# Patient Record
Sex: MALE | Race: WHITE | NOT HISPANIC OR LATINO | Employment: FULL TIME | URBAN - METROPOLITAN AREA
[De-identification: names, ages, dates, MRNs, and addresses within clinical notes are randomized per-mention and may not be internally consistent; named-entity substitution may affect disease eponyms.]

---

## 2018-07-20 ENCOUNTER — OFFICE VISIT (OUTPATIENT)
Dept: FAMILY MEDICINE | Facility: CLINIC | Age: 48
End: 2018-07-20
Payer: COMMERCIAL

## 2018-07-20 VITALS
TEMPERATURE: 99 F | HEART RATE: 84 BPM | HEIGHT: 77 IN | SYSTOLIC BLOOD PRESSURE: 122 MMHG | BODY MASS INDEX: 30.13 KG/M2 | OXYGEN SATURATION: 99 % | DIASTOLIC BLOOD PRESSURE: 82 MMHG | WEIGHT: 255.19 LBS

## 2018-07-20 DIAGNOSIS — Z23 NEED FOR TDAP VACCINATION: ICD-10-CM

## 2018-07-20 DIAGNOSIS — Z00.00 PREVENTATIVE HEALTH CARE: Primary | ICD-10-CM
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
## 2018-07-20 PROCEDURE — 90715 TDAP VACCINE 7 YRS/> IM: CPT | Mod: S$GLB,,, | Performed by: FAMILY MEDICINE

## 2018-07-20 PROCEDURE — 90471 IMMUNIZATION ADMIN: CPT | Mod: S$GLB,,, | Performed by: FAMILY MEDICINE

## 2018-07-20 PROCEDURE — 99999 PR PBB SHADOW E&M-EST. PATIENT-LVL III: CPT | Mod: PBBFAC,,, | Performed by: FAMILY MEDICINE

## 2018-07-20 PROCEDURE — 99386 PREV VISIT NEW AGE 40-64: CPT | Mod: 25,S$GLB,, | Performed by: FAMILY MEDICINE

## 2018-07-20 NOTE — PROGRESS NOTES
xc  THIS DOCUMENT WAS MADE IN PART WITH VOICE RECOGNITION SOFTWARE.  OCCASIONALLY THIS SOFTWARE WILL MISINTERPRET WORDS OR PHRASES.      Cornell Rodas Heidi  1970    Subjective          Chief Complaint   Patient presents with    Establish Care       HPI:     establish care. Here for a wellness exam and a few additional topics.     Recently moved back here from [place] I believe. 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     He has no chronic health problems.     He does report family history of prostate cancer father and maternal grandfather. He has had no urinary symptoms changes or previous concerns.     He does report that he remove the tick from his right inner thigh a few weeks ago. He doesn't think it was on for very long probably not more than an hour as you notice it after he had been doing some work outside. It appears he did remove it intact. There is no rash now and no rash initially. There is no longer even a bite oma visible. He said no fever or chills. Joint pains, no swollen glands. He states the ticket did have a white spot on his back which suggest this was a [place] tick.      Active Ambulatory Problems     Diagnosis Date Noted    Stress fracture of femur with routine healing 09/16/2014     Resolved Ambulatory Problems     Diagnosis Date Noted    No Resolved Ambulatory Problems     No Additional Past Medical History       No current outpatient prescriptions on file prior to visit.     No current facility-administered medications on file prior to visit.        Review of patient's allergies indicates:   Allergen Reactions    Motrin [ibuprofen] Hives       Family History   Problem Relation Age of Onset    Breast cancer Mother     Prostate cancer Father     Prostate cancer Maternal Grandfather        Social History   Substance Use Topics    Smoking status: Never Smoker    Smokeless tobacco: Never Used    Alcohol use Yes      Comment: pt states occasionally one one drink per  week          Review of Systems   Constitutional: Negative for activity change and unexpected weight change.   HENT: Negative for hearing loss, rhinorrhea and trouble swallowing.    Eyes: Negative for discharge and visual disturbance.   Respiratory: Negative for chest tightness and wheezing.    Cardiovascular: Negative for chest pain and palpitations.   Gastrointestinal: Negative for blood in stool, constipation, diarrhea and vomiting.   Endocrine: Negative for polydipsia and polyuria.   Genitourinary: Negative for difficulty urinating, hematuria and urgency.   Musculoskeletal: Negative for arthralgias, joint swelling and neck pain.   Neurological: Negative for weakness and headaches.   Psychiatric/Behavioral: Negative for confusion and dysphoric mood.       Objective     Physical Exam   Constitutional: He is oriented to person, place, and time. He appears well-developed and well-nourished. No distress.   HENT:   Head: Normocephalic and atraumatic.   Right Ear: Tympanic membrane, external ear and ear canal normal.   Left Ear: Tympanic membrane, external ear and ear canal normal.   Nose: Nose normal.   Mouth/Throat: Oropharynx is clear and moist.   Eyes: Conjunctivae and EOM are normal. Pupils are equal, round, and reactive to light. Right eye exhibits no discharge. Left eye exhibits no discharge. No scleral icterus.   Neck: Normal range of motion. Neck supple. No JVD present. No tracheal deviation present. No thyromegaly present.   Cardiovascular: Normal rate, regular rhythm, normal heart sounds and intact distal pulses.  Exam reveals no gallop and no friction rub.    No murmur heard.  Pulmonary/Chest: Effort normal and breath sounds normal. No stridor. No respiratory distress. He has no wheezes. He has no rales. He exhibits no tenderness.   Abdominal: Soft. Bowel sounds are normal. He exhibits no distension and no mass. There is no tenderness. There is no rebound and no guarding.   Lymphadenopathy:     He has no  "cervical adenopathy.   Neurological: He is alert and oriented to person, place, and time. He has normal reflexes. He displays normal reflexes. No cranial nerve deficit. He exhibits normal muscle tone. Coordination normal.   Skin: Skin is warm. No rash noted. He is not diaphoretic.   Examination of the right thigh where the tick bite apparently occurred reveals no rash, no lesion or remaining evidence of bite or infection.    Also he has multiple light brown nevi on his back. There is at least one that has slight regular distribution of pigment.   Psychiatric: He has a normal mood and affect. His behavior is normal. Judgment and thought content normal.   Vitals reviewed.      One irregular lesion on back, recommend derm    Vitals:    07/20/18 1536   BP: 122/82   BP Location: Left arm   Patient Position: Sitting   BP Method: Medium (Manual)   Pulse: 84   Temp: 98.8 °F (37.1 °C)   TempSrc: Oral   SpO2: 99%   Weight: 115.8 kg (255 lb 2.9 oz)   Height: 6' 5" (1.956 m)         Cornell was seen today for establish care.    Diagnoses and all orders for this visit:    Preventative health care  -     Comprehensive metabolic panel; Future  -     TSH; Future  -     Lipid panel; Future  -     CBC auto differential; Future  -     (In Office Administered) Tdap Vaccine  -     LYME DISEASE ANTIBODY BY EIA; Future    Need for Tdap vaccination  -     (In Office Administered) Tdap Vaccine    Tick bite, initial encounter  -     LYME DISEASE ANTIBODY BY EIA; Future     I did review possible Lyme transmission in this area and from the [place] tick. This tick does not appear to carry Lyme disease but of course I can't specifically identify the tick without having seen it. As a precaution will check lyme titers. But if the tick was not attached for very long risk is likely very low. There are other tickborne illnesses and concerns that have been raised with the [place] tic so he will watch for any development of symptoms or concerns as " well. Will check CBC along with additional labs.    Age specific and appropriate preventative healthcare discussed/ health maintenance reviewed. Including discussed current recommendations and controversy regarding prostate cancer screening. He would like to hold off on screening until he turns 50.

## 2018-07-23 ENCOUNTER — LAB VISIT (OUTPATIENT)
Dept: LAB | Facility: HOSPITAL | Age: 48
End: 2018-07-23
Attending: FAMILY MEDICINE
Payer: COMMERCIAL

## 2018-07-23 DIAGNOSIS — Z00.00 PREVENTATIVE HEALTH CARE: ICD-10-CM

## 2018-07-23 LAB
ALBUMIN SERPL BCP-MCNC: 4.3 G/DL
ALP SERPL-CCNC: 104 U/L
ALT SERPL W/O P-5'-P-CCNC: 26 U/L
ANION GAP SERPL CALC-SCNC: 10 MMOL/L
AST SERPL-CCNC: 28 U/L
BILIRUB SERPL-MCNC: 0.7 MG/DL
BUN SERPL-MCNC: 14 MG/DL
CALCIUM SERPL-MCNC: 9.9 MG/DL
CHLORIDE SERPL-SCNC: 100 MMOL/L
CHOLEST SERPL-MCNC: 191 MG/DL
CHOLEST/HDLC SERPL: 4.2 {RATIO}
CO2 SERPL-SCNC: 26 MMOL/L
CREAT SERPL-MCNC: 1.2 MG/DL
EST. GFR  (AFRICAN AMERICAN): >60 ML/MIN/1.73 M^2
EST. GFR  (NON AFRICAN AMERICAN): >60 ML/MIN/1.73 M^2
GLUCOSE SERPL-MCNC: 91 MG/DL
HDLC SERPL-MCNC: 46 MG/DL
HDLC SERPL: 24.1 %
LDLC SERPL CALC-MCNC: 119.8 MG/DL
NONHDLC SERPL-MCNC: 145 MG/DL
POTASSIUM SERPL-SCNC: 5.3 MMOL/L
PROT SERPL-MCNC: 8.1 G/DL
SODIUM SERPL-SCNC: 136 MMOL/L
TRIGL SERPL-MCNC: 126 MG/DL
TSH SERPL DL<=0.005 MIU/L-ACNC: 2.07 UIU/ML

## 2018-07-23 PROCEDURE — 84443 ASSAY THYROID STIM HORMONE: CPT

## 2018-07-23 PROCEDURE — 80053 COMPREHEN METABOLIC PANEL: CPT

## 2018-07-23 PROCEDURE — 36415 COLL VENOUS BLD VENIPUNCTURE: CPT | Mod: PN

## 2018-07-23 PROCEDURE — 85025 COMPLETE CBC W/AUTO DIFF WBC: CPT

## 2018-07-23 PROCEDURE — 80061 LIPID PANEL: CPT

## 2018-07-24 LAB
BASOPHILS # BLD AUTO: 0.05 K/UL
BASOPHILS NFR BLD: 0.7 %
DIFFERENTIAL METHOD: ABNORMAL
EOSINOPHIL # BLD AUTO: 0.2 K/UL
EOSINOPHIL NFR BLD: 2.9 %
ERYTHROCYTE [DISTWIDTH] IN BLOOD BY AUTOMATED COUNT: 13.1 %
HCT VFR BLD AUTO: 48.2 %
HGB BLD-MCNC: 15.5 G/DL
IMM GRANULOCYTES # BLD AUTO: 0.02 K/UL
IMM GRANULOCYTES NFR BLD AUTO: 0.3 %
LYMPHOCYTES # BLD AUTO: 2.9 K/UL
LYMPHOCYTES NFR BLD: 40.6 %
MCH RBC QN AUTO: 26.9 PG
MCHC RBC AUTO-ENTMCNC: 32.2 G/DL
MCV RBC AUTO: 84 FL
MONOCYTES # BLD AUTO: 0.8 K/UL
MONOCYTES NFR BLD: 11.3 %
NEUTROPHILS # BLD AUTO: 3.2 K/UL
NEUTROPHILS NFR BLD: 44.2 %
NRBC BLD-RTO: 0 /100 WBC
PLATELET # BLD AUTO: 240 K/UL
PMV BLD AUTO: 12 FL
RBC # BLD AUTO: 5.76 M/UL
WBC # BLD AUTO: 7.16 K/UL

## 2019-07-19 ENCOUNTER — PATIENT OUTREACH (OUTPATIENT)
Dept: ADMINISTRATIVE | Facility: HOSPITAL | Age: 49
End: 2019-07-19

## 2019-08-23 ENCOUNTER — LAB VISIT (OUTPATIENT)
Dept: LAB | Facility: HOSPITAL | Age: 49
End: 2019-08-23
Attending: FAMILY MEDICINE
Payer: COMMERCIAL

## 2019-08-23 ENCOUNTER — OFFICE VISIT (OUTPATIENT)
Dept: FAMILY MEDICINE | Facility: CLINIC | Age: 49
End: 2019-08-23
Payer: COMMERCIAL

## 2019-08-23 VITALS
TEMPERATURE: 98 F | HEIGHT: 77 IN | BODY MASS INDEX: 27.58 KG/M2 | HEART RATE: 60 BPM | RESPIRATION RATE: 16 BRPM | SYSTOLIC BLOOD PRESSURE: 110 MMHG | DIASTOLIC BLOOD PRESSURE: 70 MMHG | WEIGHT: 233.56 LBS

## 2019-08-23 DIAGNOSIS — Z00.00 PREVENTATIVE HEALTH CARE: Primary | ICD-10-CM

## 2019-08-23 DIAGNOSIS — Z00.00 PREVENTATIVE HEALTH CARE: ICD-10-CM

## 2019-08-23 LAB
ALBUMIN SERPL BCP-MCNC: 4.7 G/DL (ref 3.5–5.2)
ALP SERPL-CCNC: 99 U/L (ref 55–135)
ALT SERPL W/O P-5'-P-CCNC: 14 U/L (ref 10–44)
ANION GAP SERPL CALC-SCNC: 9 MMOL/L (ref 8–16)
AST SERPL-CCNC: 18 U/L (ref 10–40)
BASOPHILS # BLD AUTO: 0.03 K/UL (ref 0–0.2)
BASOPHILS NFR BLD: 0.4 % (ref 0–1.9)
BILIRUB SERPL-MCNC: 0.6 MG/DL (ref 0.1–1)
BUN SERPL-MCNC: 14 MG/DL (ref 6–20)
CALCIUM SERPL-MCNC: 9.7 MG/DL (ref 8.7–10.5)
CHLORIDE SERPL-SCNC: 104 MMOL/L (ref 95–110)
CHOLEST SERPL-MCNC: 207 MG/DL (ref 120–199)
CHOLEST/HDLC SERPL: 3.8 {RATIO} (ref 2–5)
CO2 SERPL-SCNC: 27 MMOL/L (ref 23–29)
CREAT SERPL-MCNC: 1.1 MG/DL (ref 0.5–1.4)
DIFFERENTIAL METHOD: ABNORMAL
EOSINOPHIL # BLD AUTO: 0.1 K/UL (ref 0–0.5)
EOSINOPHIL NFR BLD: 1.5 % (ref 0–8)
ERYTHROCYTE [DISTWIDTH] IN BLOOD BY AUTOMATED COUNT: 13.5 % (ref 11.5–14.5)
EST. GFR  (AFRICAN AMERICAN): >60 ML/MIN/1.73 M^2
EST. GFR  (NON AFRICAN AMERICAN): >60 ML/MIN/1.73 M^2
GLUCOSE SERPL-MCNC: 88 MG/DL (ref 70–110)
HCT VFR BLD AUTO: 48.9 % (ref 40–54)
HDLC SERPL-MCNC: 54 MG/DL (ref 40–75)
HDLC SERPL: 26.1 % (ref 20–50)
HGB BLD-MCNC: 15.1 G/DL (ref 14–18)
IMM GRANULOCYTES # BLD AUTO: 0.01 K/UL (ref 0–0.04)
IMM GRANULOCYTES NFR BLD AUTO: 0.1 % (ref 0–0.5)
LDLC SERPL CALC-MCNC: 121.4 MG/DL (ref 63–159)
LYMPHOCYTES # BLD AUTO: 2.5 K/UL (ref 1–4.8)
LYMPHOCYTES NFR BLD: 36.2 % (ref 18–48)
MCH RBC QN AUTO: 26.5 PG (ref 27–31)
MCHC RBC AUTO-ENTMCNC: 30.9 G/DL (ref 32–36)
MCV RBC AUTO: 86 FL (ref 82–98)
MONOCYTES # BLD AUTO: 0.5 K/UL (ref 0.3–1)
MONOCYTES NFR BLD: 6.8 % (ref 4–15)
NEUTROPHILS # BLD AUTO: 3.8 K/UL (ref 1.8–7.7)
NEUTROPHILS NFR BLD: 55 % (ref 38–73)
NONHDLC SERPL-MCNC: 153 MG/DL
NRBC BLD-RTO: 0 /100 WBC
PLATELET # BLD AUTO: 240 K/UL (ref 150–350)
PMV BLD AUTO: 12.6 FL (ref 9.2–12.9)
POTASSIUM SERPL-SCNC: 4.5 MMOL/L (ref 3.5–5.1)
PROT SERPL-MCNC: 8.1 G/DL (ref 6–8.4)
RBC # BLD AUTO: 5.69 M/UL (ref 4.6–6.2)
SODIUM SERPL-SCNC: 140 MMOL/L (ref 136–145)
TRIGL SERPL-MCNC: 158 MG/DL (ref 30–150)
WBC # BLD AUTO: 6.88 K/UL (ref 3.9–12.7)

## 2019-08-23 PROCEDURE — 99999 PR PBB SHADOW E&M-EST. PATIENT-LVL III: ICD-10-PCS | Mod: PBBFAC,,, | Performed by: FAMILY MEDICINE

## 2019-08-23 PROCEDURE — 99999 PR PBB SHADOW E&M-EST. PATIENT-LVL III: CPT | Mod: PBBFAC,,, | Performed by: FAMILY MEDICINE

## 2019-08-23 PROCEDURE — 99396 PREV VISIT EST AGE 40-64: CPT | Mod: S$GLB,,, | Performed by: FAMILY MEDICINE

## 2019-08-23 PROCEDURE — 85025 COMPLETE CBC W/AUTO DIFF WBC: CPT

## 2019-08-23 PROCEDURE — 80053 COMPREHEN METABOLIC PANEL: CPT

## 2019-08-23 PROCEDURE — 99396 PR PREVENTIVE VISIT,EST,40-64: ICD-10-PCS | Mod: S$GLB,,, | Performed by: FAMILY MEDICINE

## 2019-08-23 PROCEDURE — 36415 COLL VENOUS BLD VENIPUNCTURE: CPT | Mod: PN

## 2019-08-23 PROCEDURE — 80061 LIPID PANEL: CPT

## 2019-08-23 NOTE — PROGRESS NOTES
THIS DOCUMENT WAS MADE IN PART WITH VOICE RECOGNITION SOFTWARE.  OCCASIONALLY THIS SOFTWARE WILL MISINTERPRET WORDS OR PHRASES.      Cornell Gordon  1970    Cornell was seen today for annual exam.    Diagnoses and all orders for this visit:    Preventative health care  -     Comprehensive metabolic panel; Future  -     Lipid panel; Future  -     CBC auto differential; Future   He is doing well with more than 20 pound weight loss over the last year. Reviewed BMI charts and standards.  Will continue healthy lifestyle and returning one year for physical. If anything changes or if he develops any concerns he will certainly let me know      Subjective     Chief Complaint   Patient presents with    Annual Exam       HPI     He returns for his annual wellness. He has been doing well. States has lost significant weight gradually over the last year with a healthy diet and regular exercise. He feels well.    Active Ambulatory Problems     Diagnosis Date Noted    Stress fracture of femur with routine healing 09/16/2014     Resolved Ambulatory Problems     Diagnosis Date Noted    No Resolved Ambulatory Problems     No Additional Past Medical History         Review of Systems   Constitutional: Negative.    HENT: Negative.    Eyes: Negative.    Respiratory: Negative.    Cardiovascular: Negative.    Gastrointestinal: Negative.    Endocrine: Negative.    Genitourinary: Negative.    Musculoskeletal: Negative.        Objective     Physical Exam   Constitutional: He is oriented to person, place, and time. He appears well-developed and well-nourished. No distress.   HENT:   Head: Normocephalic and atraumatic.   Right Ear: Tympanic membrane, external ear and ear canal normal.   Left Ear: Tympanic membrane, external ear and ear canal normal.   Nose: Nose normal.   Mouth/Throat: Oropharynx is clear and moist. No oropharyngeal exudate.   Eyes: Pupils are equal, round, and reactive to light. Conjunctivae and EOM are normal. Right  "eye exhibits no discharge. Left eye exhibits no discharge. No scleral icterus.   Neck: Normal range of motion. Neck supple. No thyromegaly present.   Cardiovascular: Normal rate, regular rhythm, normal heart sounds and intact distal pulses. Exam reveals no gallop and no friction rub.   No murmur heard.  Pulmonary/Chest: Effort normal and breath sounds normal. No stridor. No respiratory distress. He has no wheezes. He has no rales. He exhibits no tenderness.   Abdominal: Soft. Bowel sounds are normal. He exhibits no distension and no mass. There is no tenderness. There is no rebound and no guarding.   Lymphadenopathy:     He has no cervical adenopathy.   Neurological: He is alert and oriented to person, place, and time. He has normal reflexes. He exhibits normal muscle tone. Coordination normal.   Skin: He is not diaphoretic.   Psychiatric: He has a normal mood and affect. His behavior is normal.   Vitals reviewed.    Vitals:    08/23/19 1107   BP: 110/70   BP Location: Left arm   Patient Position: Sitting   BP Method: Large (Manual)   Pulse: 60   Resp: 16   Temp: 97.7 °F (36.5 °C)   TempSrc: Oral   Weight: 106 kg (233 lb 9.2 oz)   Height: 6' 5" (1.956 m)           "

## 2019-11-27 ENCOUNTER — OFFICE VISIT (OUTPATIENT)
Dept: DERMATOLOGY | Facility: CLINIC | Age: 49
End: 2019-11-27
Payer: COMMERCIAL

## 2019-11-27 VITALS — HEIGHT: 77 IN | BODY MASS INDEX: 27.51 KG/M2 | WEIGHT: 233 LBS

## 2019-11-27 DIAGNOSIS — Z12.83 SKIN CANCER SCREENING: ICD-10-CM

## 2019-11-27 DIAGNOSIS — D48.5 NEOPLASM OF UNCERTAIN BEHAVIOR OF SKIN: Primary | ICD-10-CM

## 2019-11-27 DIAGNOSIS — L82.1 SEBORRHEIC KERATOSES: ICD-10-CM

## 2019-11-27 PROCEDURE — 3008F BODY MASS INDEX DOCD: CPT | Mod: CPTII,S$GLB,, | Performed by: DERMATOLOGY

## 2019-11-27 PROCEDURE — 99999 PR PBB SHADOW E&M-EST. PATIENT-LVL III: ICD-10-PCS | Mod: PBBFAC,,, | Performed by: DERMATOLOGY

## 2019-11-27 PROCEDURE — 99202 OFFICE O/P NEW SF 15 MIN: CPT | Mod: 25,S$GLB,, | Performed by: DERMATOLOGY

## 2019-11-27 PROCEDURE — 88305 TISSUE EXAM BY PATHOLOGIST: CPT | Mod: 26,,, | Performed by: PATHOLOGY

## 2019-11-27 PROCEDURE — 11102 PR TANGENTIAL BIOPSY, SKIN, SINGLE LESION: ICD-10-PCS | Mod: S$GLB,,, | Performed by: DERMATOLOGY

## 2019-11-27 PROCEDURE — 88305 TISSUE EXAM BY PATHOLOGIST: ICD-10-PCS | Mod: 26,,, | Performed by: PATHOLOGY

## 2019-11-27 PROCEDURE — 99202 PR OFFICE/OUTPT VISIT, NEW, LEVL II, 15-29 MIN: ICD-10-PCS | Mod: 25,S$GLB,, | Performed by: DERMATOLOGY

## 2019-11-27 PROCEDURE — 99999 PR PBB SHADOW E&M-EST. PATIENT-LVL III: CPT | Mod: PBBFAC,,, | Performed by: DERMATOLOGY

## 2019-11-27 PROCEDURE — 3008F PR BODY MASS INDEX (BMI) DOCUMENTED: ICD-10-PCS | Mod: CPTII,S$GLB,, | Performed by: DERMATOLOGY

## 2019-11-27 PROCEDURE — 11102 TANGNTL BX SKIN SINGLE LES: CPT | Mod: S$GLB,,, | Performed by: DERMATOLOGY

## 2019-11-27 PROCEDURE — 88305 TISSUE EXAM BY PATHOLOGIST: CPT | Performed by: PATHOLOGY

## 2019-11-27 NOTE — PROGRESS NOTES
Subjective:       Patient ID:  Cornell Gordon is a 48 y.o. male who presents for   Chief Complaint   Patient presents with    Skin Check     TBSE    Spot     Mid back, years, darker, no tx     Initial visit    Here today for TBSE and spot to mid back for few years.  Darker in color, no tx.    Hx of moderate sun exposure, few blistering sunburns in youth.  Yardwork, runner, and beach activities    Previously seen by derm 5 years ago in [place], pt uncertain to name  Bx on forearm-per pt, benign    Pt denies Phx NMSC  Pt denies Fhx MM      Review of Systems   Constitutional: Negative for fever, chills and fatigue.   Skin: Positive for activity-related sunscreen use and wears hat. Negative for daily sunscreen use.   Hematologic/Lymphatic: Does not bruise/bleed easily.        Objective:    Physical Exam   Constitutional: He appears well-developed and well-nourished. No distress.   Neurological: He is alert and oriented to person, place, and time. He is not disoriented.   Psychiatric: He has a normal mood and affect.   Skin:   Areas Examined (abnormalities noted in diagram):   Head / Face Inspection Performed  Neck Inspection Performed  Chest / Axilla Inspection Performed  Abdomen Inspection Performed  Back Inspection Performed  RUE Inspected  LUE Inspection Performed  RLE Inspected  LLE Inspection Performed                   Diagram Legend     Erythematous scaling macule/papule c/w actinic keratosis       Vascular papule c/w angioma      Pigmented verrucoid papule/plaque c/w seborrheic keratosis      Yellow umbilicated papule c/w sebaceous hyperplasia      Irregularly shaped tan macule c/w lentigo     1-2 mm smooth white papules consistent with Milia      Movable subcutaneous cyst with punctum c/w epidermal inclusion cyst      Subcutaneous movable cyst c/w pilar cyst      Firm pink to brown papule c/w dermatofibroma      Pedunculated fleshy papule(s) c/w skin tag(s)      Evenly pigmented macule c/w junctional nevus      Mildly variegated pigmented, slightly irregular-bordered macule c/w mildly atypical nevus      Flesh colored to evenly pigmented papule c/w intradermal nevus       Pink pearly papule/plaque c/w basal cell carcinoma      Erythematous hyperkeratotic cursted plaque c/w SCC      Surgical scar with no sign of skin cancer recurrence      Open and closed comedones      Inflammatory papules and pustules      Verrucoid papule consistent consistent with wart     Erythematous eczematous patches and plaques     Dystrophic onycholytic nail with subungual debris c/w onychomycosis     Umbilicated papule    Erythematous-base heme-crusted tan verrucoid plaque consistent with inflamed seborrheic keratosis     Erythematous Silvery Scaling Plaque c/w Psoriasis     See annotation      Assessment / Plan:      Pathology Orders:     Normal Orders This Visit    Specimen to Pathology, Dermatology     Questions:    Procedure Type:  Dermatology and skin neoplasms    Number of Specimens:  1    ------------------------:  -------------------------    Spec 1 Procedure:  Biopsy    Spec 1 Clinical Impression:  isk r/o inflamed nevus vs other    Spec 1 Source:  left back        Neoplasm of uncertain behavior of skin  -     Specimen to Pathology, Dermatology  Shave biopsy procedure note:    Shave biopsy performed after verbal consent including risk of infection, scar, recurrence, need for additional treatment of site. Area prepped with alcohol, anesthetized with approximately 1.0cc of 1% lidocaine with epinephrine. Lesional tissue shaved with razor blade. Hemostasis achieved with application of aluminum chloride followed by hyfrecation. No complications. Dressing applied. Wound care explained.        Seborrheic keratoses  Back  These are benign inherited growths without a malignant potential. Reassurance given to patient. No treatment is necessary.     Skin cancer screening  Upper body skin examination performed today including at least 6 points as  noted in physical examination. No lesions suspicious for malignancy noted.               Follow up in about 1 year (around 11/27/2020).

## 2019-12-06 LAB
FINAL PATHOLOGIC DIAGNOSIS: NORMAL
GROSS: NORMAL

## 2020-08-13 ENCOUNTER — OFFICE VISIT (OUTPATIENT)
Dept: FAMILY MEDICINE | Facility: CLINIC | Age: 50
End: 2020-08-13
Payer: COMMERCIAL

## 2020-08-13 ENCOUNTER — HOSPITAL ENCOUNTER (OUTPATIENT)
Dept: RADIOLOGY | Facility: HOSPITAL | Age: 50
Discharge: HOME | End: 2020-08-13
Attending: FAMILY MEDICINE
Payer: COMMERCIAL

## 2020-08-13 VITALS
BODY MASS INDEX: 25.92 KG/M2 | TEMPERATURE: 97 F | RESPIRATION RATE: 17 BRPM | DIASTOLIC BLOOD PRESSURE: 72 MMHG | HEART RATE: 60 BPM | SYSTOLIC BLOOD PRESSURE: 108 MMHG | HEIGHT: 77 IN | WEIGHT: 219.56 LBS

## 2020-08-13 DIAGNOSIS — Z00.00 PREVENTATIVE HEALTH CARE: ICD-10-CM

## 2020-08-13 DIAGNOSIS — Z00.00 PREVENTATIVE HEALTH CARE: Primary | ICD-10-CM

## 2020-08-13 PROCEDURE — 71046 X-RAY EXAM CHEST 2 VIEWS: CPT | Mod: TC,PN

## 2020-08-13 PROCEDURE — 99396 PREV VISIT EST AGE 40-64: CPT | Mod: S$GLB,,, | Performed by: FAMILY MEDICINE

## 2020-08-13 PROCEDURE — 99396 PR PREVENTIVE VISIT,EST,40-64: ICD-10-PCS | Mod: S$GLB,,, | Performed by: FAMILY MEDICINE

## 2020-08-13 PROCEDURE — 99999 PR PBB SHADOW E&M-EST. PATIENT-LVL IV: ICD-10-PCS | Mod: PBBFAC,,, | Performed by: FAMILY MEDICINE

## 2020-08-13 PROCEDURE — 99999 PR PBB SHADOW E&M-EST. PATIENT-LVL IV: CPT | Mod: PBBFAC,,, | Performed by: FAMILY MEDICINE

## 2020-08-13 PROCEDURE — 71046 X-RAY EXAM CHEST 2 VIEWS: CPT | Mod: 26,,, | Performed by: RADIOLOGY

## 2020-08-13 PROCEDURE — 3008F BODY MASS INDEX DOCD: CPT | Mod: CPTII,S$GLB,, | Performed by: FAMILY MEDICINE

## 2020-08-13 PROCEDURE — 3008F PR BODY MASS INDEX (BMI) DOCUMENTED: ICD-10-PCS | Mod: CPTII,S$GLB,, | Performed by: FAMILY MEDICINE

## 2020-08-13 PROCEDURE — 71046 XR CHEST PA AND LATERAL: ICD-10-PCS | Mod: 26,,, | Performed by: RADIOLOGY

## 2020-08-13 NOTE — PROGRESS NOTES
THIS DOCUMENT WAS MADE IN PART WITH VOICE RECOGNITION SOFTWARE.  OCCASIONALLY THIS SOFTWARE WILL MISINTERPRET WORDS OR PHRASES.      Cornell Gordon  1970    Cornell was seen today for annual exam.    Diagnoses and all orders for this visit:    Preventative health care  -     Comprehensive metabolic panel; Future  -     Lipid Panel; Future  -     TSH; Future  -     CBC auto differential; Future  -     PSA, Screening; Future  -     X-Ray Chest PA And Lateral; Future  -     COVID-19 (SARS CoV-2) IgG Antibody; Future      Discussed colon cancer screening at age 50.  He will call in January to schedule/order colonoscopy.  He is not having any worrisome symptoms.      Subjective     Chief Complaint   Patient presents with    Annual Exam       HPI:    Annual wellness.  He has been well, no illnesses.  Continues to exercise, job regularly.  Following a healthy lifestyle.    Mother dx with lung CA, no smoking, h/o  Father prostate cancer        Active Ambulatory Problems     Diagnosis Date Noted    Stress fracture of femur with routine healing 09/16/2014     Resolved Ambulatory Problems     Diagnosis Date Noted    No Resolved Ambulatory Problems     No Additional Past Medical History       Current Outpatient Medications on File Prior to Visit   Medication Sig Dispense Refill    ergocalciferol, vitamin D2, (VITAMIN D ORAL) Take by mouth once daily.      LYSINE ORAL Take 1 Dose by mouth once daily.      multivitamin capsule Take 1 capsule by mouth once daily.       No current facility-administered medications on file prior to visit.        Review of patient's allergies indicates:   Allergen Reactions    Motrin [ibuprofen] Hives       Family History   Problem Relation Age of Onset    Breast cancer Mother     Prostate cancer Father     Prostate cancer Maternal Grandfather     Melanoma Neg Hx     Psoriasis Neg Hx     Lupus Neg Hx     Eczema Neg Hx        Social History     Tobacco Use    Smoking status:  Never Smoker    Smokeless tobacco: Never Used   Substance Use Topics    Alcohol use: Yes     Frequency: Monthly or less     Drinks per session: 1 or 2     Binge frequency: Never     Comment: pt states occasionally one one drink per week     Drug use: No         Review of Systems   Constitutional: Negative for activity change and unexpected weight change.   HENT: Negative for hearing loss, rhinorrhea and trouble swallowing.    Eyes: Negative for discharge and visual disturbance.   Respiratory: Negative for chest tightness and wheezing.    Cardiovascular: Negative for chest pain and palpitations.   Gastrointestinal: Negative for blood in stool, constipation, diarrhea and vomiting.   Endocrine: Negative for polydipsia and polyuria.   Genitourinary: Negative for difficulty urinating, hematuria and urgency.   Musculoskeletal: Negative for arthralgias, joint swelling and neck pain.   Neurological: Negative for weakness and headaches.   Psychiatric/Behavioral: Negative for confusion and dysphoric mood.       Objective     Physical Exam  Vitals signs reviewed.   Constitutional:       General: He is not in acute distress.     Appearance: Normal appearance. He is well-developed. He is not ill-appearing, toxic-appearing or diaphoretic.   HENT:      Head: Normocephalic and atraumatic.      Right Ear: Tympanic membrane, ear canal and external ear normal.      Left Ear: Tympanic membrane, ear canal and external ear normal.      Nose: Nose normal.      Mouth/Throat:      Pharynx: Oropharynx is clear. No oropharyngeal exudate.   Eyes:      General: No scleral icterus.        Right eye: No discharge.         Left eye: No discharge.      Conjunctiva/sclera: Conjunctivae normal.      Pupils: Pupils are equal, round, and reactive to light.   Neck:      Musculoskeletal: Normal range of motion and neck supple.      Thyroid: No thyromegaly.   Cardiovascular:      Rate and Rhythm: Normal rate and regular rhythm.      Heart sounds:  "Normal heart sounds. No murmur. No friction rub. No gallop.    Pulmonary:      Effort: Pulmonary effort is normal. No respiratory distress.      Breath sounds: Normal breath sounds. No stridor. No wheezing or rales.   Chest:      Chest wall: No tenderness.   Abdominal:      General: Bowel sounds are normal. There is no distension.      Palpations: Abdomen is soft. There is no mass.      Tenderness: There is no abdominal tenderness. There is no guarding or rebound.   Lymphadenopathy:      Cervical: No cervical adenopathy.   Neurological:      Mental Status: He is alert and oriented to person, place, and time.      Motor: No abnormal muscle tone.      Coordination: Coordination normal.      Deep Tendon Reflexes: Reflexes are normal and symmetric.   Psychiatric:         Mood and Affect: Mood normal.         Behavior: Behavior normal.         Thought Content: Thought content normal.         Vitals:    08/13/20 0842   BP: 108/72   BP Location: Right arm   Patient Position: Sitting   Pulse: 60   Resp: 17   Temp: 97 °F (36.1 °C)   TempSrc: Skin   Weight: 99.6 kg (219 lb 9.3 oz)   Height: 6' 5" (1.956 m)       MOST RECENT LABS IN OUR ELECTRONIC MEDICAL RECORD:     Results for orders placed or performed in visit on 11/27/19   Specimen to Pathology, Dermatology   Result Value Ref Range    Final Pathologic Diagnosis       1.  Skin, left back, shave biopsy:  - INFLAMED AND IRRITATED SEBORRHEIC KERATOSIS.    MICROSCOPIC DESCRIPTION: Sections show an abrupt proliferation of benign  basaloid keratinocytes associated with horned pseudocyst formation and  squamous eddies. A lymphocytic infiltrate is noted.      Gross       Patient ID/pathology ID:  [id number]  Received in formalin labeled "left back" is a shave of tan-brown skin  measuring 5 mm in diameter.  Deep surfaces inked blue and submitted entirely  in cassette [id number]      Grossed by HENRY Gallardo           Age specific and appropriate preventative healthcare discussed/ " health maintenance reviewed. Discussed healthy diet and regular exercise. Discussed age-appropriate preventative screening tests and recommendations. Discussed recommended follow-up based on age and conditions.

## 2021-05-06 ENCOUNTER — PATIENT MESSAGE (OUTPATIENT)
Dept: RESEARCH | Facility: HOSPITAL | Age: 51
End: 2021-05-06

## 2022-01-06 ENCOUNTER — IMMUNIZATION (OUTPATIENT)
Dept: FAMILY MEDICINE | Facility: CLINIC | Age: 52
End: 2022-01-06
Payer: COMMERCIAL

## 2022-01-06 DIAGNOSIS — Z23 NEED FOR VACCINATION: Primary | ICD-10-CM

## 2022-01-06 PROCEDURE — 0034A COVID-19,VECTOR-NR,RS-AD26,PF,0.5 ML DOSE VACCINE (JANSSEN): CPT | Mod: PBBFAC | Performed by: FAMILY MEDICINE

## 2022-10-14 ENCOUNTER — OFFICE VISIT (OUTPATIENT)
Dept: FAMILY MEDICINE | Facility: CLINIC | Age: 52
End: 2022-10-14
Payer: COMMERCIAL

## 2022-10-14 ENCOUNTER — LAB VISIT (OUTPATIENT)
Dept: LAB | Facility: HOSPITAL | Age: 52
End: 2022-10-14
Attending: FAMILY MEDICINE
Payer: COMMERCIAL

## 2022-10-14 VITALS
DIASTOLIC BLOOD PRESSURE: 78 MMHG | BODY MASS INDEX: 26.07 KG/M2 | SYSTOLIC BLOOD PRESSURE: 108 MMHG | WEIGHT: 220.81 LBS | HEART RATE: 58 BPM | RESPIRATION RATE: 18 BRPM | OXYGEN SATURATION: 100 % | HEIGHT: 77 IN

## 2022-10-14 DIAGNOSIS — Z23 IMMUNIZATION DUE: ICD-10-CM

## 2022-10-14 DIAGNOSIS — Z00.00 WELLNESS EXAMINATION: Primary | ICD-10-CM

## 2022-10-14 DIAGNOSIS — Z00.00 WELLNESS EXAMINATION: ICD-10-CM

## 2022-10-14 DIAGNOSIS — Z12.11 COLON CANCER SCREENING: ICD-10-CM

## 2022-10-14 LAB
BASOPHILS # BLD AUTO: 0.04 K/UL (ref 0–0.2)
BASOPHILS NFR BLD: 0.8 % (ref 0–1.9)
DIFFERENTIAL METHOD: ABNORMAL
EOSINOPHIL # BLD AUTO: 0.1 K/UL (ref 0–0.5)
EOSINOPHIL NFR BLD: 1.5 % (ref 0–8)
ERYTHROCYTE [DISTWIDTH] IN BLOOD BY AUTOMATED COUNT: 13.3 % (ref 11.5–14.5)
HCT VFR BLD AUTO: 46.4 % (ref 40–54)
HGB BLD-MCNC: 14.8 G/DL (ref 14–18)
IMM GRANULOCYTES # BLD AUTO: 0.01 K/UL (ref 0–0.04)
IMM GRANULOCYTES NFR BLD AUTO: 0.2 % (ref 0–0.5)
LYMPHOCYTES # BLD AUTO: 1.7 K/UL (ref 1–4.8)
LYMPHOCYTES NFR BLD: 36.1 % (ref 18–48)
MCH RBC QN AUTO: 27.5 PG (ref 27–31)
MCHC RBC AUTO-ENTMCNC: 31.9 G/DL (ref 32–36)
MCV RBC AUTO: 86 FL (ref 82–98)
MONOCYTES # BLD AUTO: 0.3 K/UL (ref 0.3–1)
MONOCYTES NFR BLD: 6.7 % (ref 4–15)
NEUTROPHILS # BLD AUTO: 2.6 K/UL (ref 1.8–7.7)
NEUTROPHILS NFR BLD: 54.7 % (ref 38–73)
NRBC BLD-RTO: 0 /100 WBC
PLATELET # BLD AUTO: 217 K/UL (ref 150–450)
PMV BLD AUTO: 12.2 FL (ref 9.2–12.9)
RBC # BLD AUTO: 5.38 M/UL (ref 4.6–6.2)
WBC # BLD AUTO: 4.76 K/UL (ref 3.9–12.7)

## 2022-10-14 PROCEDURE — 3078F PR MOST RECENT DIASTOLIC BLOOD PRESSURE < 80 MM HG: ICD-10-PCS | Mod: CPTII,S$GLB,, | Performed by: FAMILY MEDICINE

## 2022-10-14 PROCEDURE — 99999 PR PBB SHADOW E&M-EST. PATIENT-LVL IV: CPT | Mod: PBBFAC,,, | Performed by: FAMILY MEDICINE

## 2022-10-14 PROCEDURE — 84153 ASSAY OF PSA TOTAL: CPT | Performed by: FAMILY MEDICINE

## 2022-10-14 PROCEDURE — 87389 HIV-1 AG W/HIV-1&-2 AB AG IA: CPT | Performed by: FAMILY MEDICINE

## 2022-10-14 PROCEDURE — 99396 PR PREVENTIVE VISIT,EST,40-64: ICD-10-PCS | Mod: 25,S$GLB,, | Performed by: FAMILY MEDICINE

## 2022-10-14 PROCEDURE — 80061 LIPID PANEL: CPT | Performed by: FAMILY MEDICINE

## 2022-10-14 PROCEDURE — 90471 FLU VACCINE (QUAD) GREATER THAN OR EQUAL TO 3YO PRESERVATIVE FREE IM: ICD-10-PCS | Mod: S$GLB,,, | Performed by: FAMILY MEDICINE

## 2022-10-14 PROCEDURE — 3074F PR MOST RECENT SYSTOLIC BLOOD PRESSURE < 130 MM HG: ICD-10-PCS | Mod: CPTII,S$GLB,, | Performed by: FAMILY MEDICINE

## 2022-10-14 PROCEDURE — 90686 IIV4 VACC NO PRSV 0.5 ML IM: CPT | Mod: S$GLB,,, | Performed by: FAMILY MEDICINE

## 2022-10-14 PROCEDURE — 86803 HEPATITIS C AB TEST: CPT | Performed by: FAMILY MEDICINE

## 2022-10-14 PROCEDURE — 36415 COLL VENOUS BLD VENIPUNCTURE: CPT | Mod: PN | Performed by: FAMILY MEDICINE

## 2022-10-14 PROCEDURE — 83036 HEMOGLOBIN GLYCOSYLATED A1C: CPT | Performed by: FAMILY MEDICINE

## 2022-10-14 PROCEDURE — 90471 IMMUNIZATION ADMIN: CPT | Mod: S$GLB,,, | Performed by: FAMILY MEDICINE

## 2022-10-14 PROCEDURE — 1159F MED LIST DOCD IN RCRD: CPT | Mod: CPTII,S$GLB,, | Performed by: FAMILY MEDICINE

## 2022-10-14 PROCEDURE — 1160F PR REVIEW ALL MEDS BY PRESCRIBER/CLIN PHARMACIST DOCUMENTED: ICD-10-PCS | Mod: CPTII,S$GLB,, | Performed by: FAMILY MEDICINE

## 2022-10-14 PROCEDURE — 3074F SYST BP LT 130 MM HG: CPT | Mod: CPTII,S$GLB,, | Performed by: FAMILY MEDICINE

## 2022-10-14 PROCEDURE — 84439 ASSAY OF FREE THYROXINE: CPT | Performed by: FAMILY MEDICINE

## 2022-10-14 PROCEDURE — 90686 FLU VACCINE (QUAD) GREATER THAN OR EQUAL TO 3YO PRESERVATIVE FREE IM: ICD-10-PCS | Mod: S$GLB,,, | Performed by: FAMILY MEDICINE

## 2022-10-14 PROCEDURE — 1159F PR MEDICATION LIST DOCUMENTED IN MEDICAL RECORD: ICD-10-PCS | Mod: CPTII,S$GLB,, | Performed by: FAMILY MEDICINE

## 2022-10-14 PROCEDURE — 99999 PR PBB SHADOW E&M-EST. PATIENT-LVL IV: ICD-10-PCS | Mod: PBBFAC,,, | Performed by: FAMILY MEDICINE

## 2022-10-14 PROCEDURE — 85025 COMPLETE CBC W/AUTO DIFF WBC: CPT | Performed by: FAMILY MEDICINE

## 2022-10-14 PROCEDURE — 84443 ASSAY THYROID STIM HORMONE: CPT | Performed by: FAMILY MEDICINE

## 2022-10-14 PROCEDURE — 80053 COMPREHEN METABOLIC PANEL: CPT | Performed by: FAMILY MEDICINE

## 2022-10-14 PROCEDURE — 99396 PREV VISIT EST AGE 40-64: CPT | Mod: 25,S$GLB,, | Performed by: FAMILY MEDICINE

## 2022-10-14 PROCEDURE — 3078F DIAST BP <80 MM HG: CPT | Mod: CPTII,S$GLB,, | Performed by: FAMILY MEDICINE

## 2022-10-14 PROCEDURE — 1160F RVW MEDS BY RX/DR IN RCRD: CPT | Mod: CPTII,S$GLB,, | Performed by: FAMILY MEDICINE

## 2022-10-14 NOTE — PROGRESS NOTES
THIS DOCUMENT WAS MADE IN PART WITH VOICE RECOGNITION SOFTWARE.  OCCASIONALLY THIS SOFTWARE WILL MISINTERPRET WORDS OR PHRASES.    Assessment and Plan:    1. Wellness examination  CBC Auto Differential    Comprehensive Metabolic Panel    Lipid Panel    Hemoglobin A1C    TSH    T4, Free    HIV 1/2 Ag/Ab (4th Gen)    Urinalysis, Reflex to Urine Culture Urine, Clean Catch    Hepatitis C Antibody    PSA, Screening      2. Immunization due        3. Colon cancer screening  Case Request Endoscopy: COLONOSCOPY          Wellness labs as above     Ordered screening colonoscopy     Influenza vaccine today    ______________________________________________________________________  Subjective:    Chief Complaint:  Chief Complaint   Patient presents with    Annual Exam        HPI:  Cornell is a 51 y.o. year old     New patient presents today for annual wellness exam    No specific complaints or concerns other than questioning frequency of prostate cancer screening and colon cancer screening     Fairly healthy, exercises regularly        Past Medical History:  History reviewed. No pertinent past medical history.    Past Surgical History:  History reviewed. No pertinent surgical history.    Family History:  Family History   Problem Relation Age of Onset    Breast cancer Mother     Lung cancer Mother     Cancer Mother         Breast cancer,  lung cancer    COPD Mother     Prostate cancer Father     Cancer Father         Prostate Cancer    Stroke Father         Has vascular dementia caused by mini strokes.    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[employer]     Comment:  "   Tobacco Use    Smoking status: Never    Smokeless tobacco: Never   Substance and Sexual Activity    Alcohol use: Yes     Alcohol/week: 1.0 standard drink     Types: 1 Cans of beer per week     Comment: pt states occasionally one one drink per week     Drug use: Never    Sexual activity: Yes     Partners: Female     Birth control/protection: Condom   Social History Narrative    Job :  [employer]     Exercise : Running 3-5 miles 3x per week     Diet : No restriction, "watch what I eat"     Kid : 15      Social Determinants of Health     Financial Resource Strain: Unknown    Difficulty of Paying Living Expenses: Patient refused   Food Insecurity: No Food Insecurity    Worried About Running Out of Food in the Last Year: Never true    Ran Out of Food in the Last Year: Never true   Transportation Needs: No Transportation Needs    Lack of Transportation (Medical): No    Lack of Transportation (Non-Medical): No   Physical Activity: Insufficiently Active    Days of Exercise per Week: 2 days    Minutes of Exercise per Session: 40 min   Stress: No Stress Concern Present    Feeling of Stress : Not at all   Social Connections: Unknown    Frequency of Communication with Friends and Family: More than three times a week    Frequency of Social Gatherings with Friends and Family: More than three times a week    Active Member of Clubs or Organizations: Yes    Attends Club or Organization Meetings: 1 to 4 times per year    Marital Status:    Housing Stability: Low Risk     Unable to Pay for Housing in the Last Year: No    Number of Places Lived in the Last Year: 1    Unstable Housing in the Last Year: No       Medications:  Current Outpatient Medications on File Prior to Visit   Medication Sig Dispense Refill    ergocalciferol, vitamin D2, (VITAMIN D ORAL) Take by mouth once daily.      LYSINE ORAL Take 1 Dose by mouth once daily.      multivitamin capsule Take 1 capsule by mouth once daily.       No " "current facility-administered medications on file prior to visit.       Allergies:  Motrin [ibuprofen]    Immunizations:  Immunization History   Administered Date(s) Administered    COVID-19, vector-nr, rS-Ad26, PF ([employer]) 01/06/2022    Tdap 07/20/2018       Review of Systems:  Review of Systems   All other systems reviewed and are negative.    Objective:    Vitals:  Vitals:    10/14/22 0750   BP: 108/78   Pulse: (!) 58   Resp: 18   SpO2: 100%   Weight: 100.1 kg (220 lb 12.7 oz)   Height: 6' 5" (1.956 m)   PainSc: 0-No pain       Physical Exam  Vitals reviewed.   Constitutional:       General: He is not in acute distress.  HENT:      Head: Normocephalic and atraumatic.   Eyes:      Pupils: Pupils are equal, round, and reactive to light.   Cardiovascular:      Rate and Rhythm: Normal rate and regular rhythm.      Heart sounds: No murmur heard.    No friction rub.   Pulmonary:      Effort: Pulmonary effort is normal.      Breath sounds: Normal breath sounds.   Abdominal:      General: Bowel sounds are normal. There is no distension.      Palpations: Abdomen is soft.      Tenderness: There is no abdominal tenderness.   Genitourinary:     Prostate: Not enlarged, not tender and no nodules present.      Rectum: Normal.   Musculoskeletal:      Cervical back: Neck supple.   Skin:     General: Skin is warm and dry.      Findings: No rash.   Psychiatric:         Behavior: Behavior normal.           Gerber Chi MD  Family Medicine        "

## 2022-10-15 LAB
ALBUMIN SERPL BCP-MCNC: 4.4 G/DL (ref 3.5–5.2)
ALP SERPL-CCNC: 85 U/L (ref 55–135)
ALT SERPL W/O P-5'-P-CCNC: 13 U/L (ref 10–44)
ANION GAP SERPL CALC-SCNC: 8 MMOL/L (ref 8–16)
AST SERPL-CCNC: 19 U/L (ref 10–40)
BILIRUB SERPL-MCNC: 0.6 MG/DL (ref 0.1–1)
BUN SERPL-MCNC: 15 MG/DL (ref 6–20)
CALCIUM SERPL-MCNC: 9.7 MG/DL (ref 8.7–10.5)
CHLORIDE SERPL-SCNC: 103 MMOL/L (ref 95–110)
CHOLEST SERPL-MCNC: 206 MG/DL (ref 120–199)
CHOLEST/HDLC SERPL: 4.2 {RATIO} (ref 2–5)
CO2 SERPL-SCNC: 27 MMOL/L (ref 23–29)
COMPLEXED PSA SERPL-MCNC: 3.7 NG/ML (ref 0–4)
CREAT SERPL-MCNC: 1.1 MG/DL (ref 0.5–1.4)
EST. GFR  (NO RACE VARIABLE): >60 ML/MIN/1.73 M^2
ESTIMATED AVG GLUCOSE: 105 MG/DL (ref 68–131)
GLUCOSE SERPL-MCNC: 81 MG/DL (ref 70–110)
HBA1C MFR BLD: 5.3 % (ref 4–5.6)
HCV AB SERPL QL IA: NORMAL
HDLC SERPL-MCNC: 49 MG/DL (ref 40–75)
HDLC SERPL: 23.8 % (ref 20–50)
HIV 1+2 AB+HIV1 P24 AG SERPL QL IA: NORMAL
LDLC SERPL CALC-MCNC: 135 MG/DL (ref 63–159)
NONHDLC SERPL-MCNC: 157 MG/DL
POTASSIUM SERPL-SCNC: 4.7 MMOL/L (ref 3.5–5.1)
PROT SERPL-MCNC: 7.7 G/DL (ref 6–8.4)
SODIUM SERPL-SCNC: 138 MMOL/L (ref 136–145)
T4 FREE SERPL-MCNC: 0.92 NG/DL (ref 0.71–1.51)
TRIGL SERPL-MCNC: 110 MG/DL (ref 30–150)
TSH SERPL DL<=0.005 MIU/L-ACNC: 3.15 UIU/ML (ref 0.4–4)

## 2022-10-31 ENCOUNTER — TELEPHONE (OUTPATIENT)
Dept: GASTROENTEROLOGY | Facility: CLINIC | Age: 52
End: 2022-10-31
Payer: COMMERCIAL

## 2022-12-15 ENCOUNTER — PATIENT MESSAGE (OUTPATIENT)
Dept: GASTROENTEROLOGY | Facility: CLINIC | Age: 52
End: 2022-12-15
Payer: COMMERCIAL

## 2023-09-04 ENCOUNTER — PATIENT MESSAGE (OUTPATIENT)
Dept: ADMINISTRATIVE | Facility: HOSPITAL | Age: 53
End: 2023-09-04
Payer: COMMERCIAL

## 2023-09-05 ENCOUNTER — PATIENT MESSAGE (OUTPATIENT)
Dept: ADMINISTRATIVE | Facility: HOSPITAL | Age: 53
End: 2023-09-05
Payer: COMMERCIAL

## 2023-09-05 ENCOUNTER — PATIENT OUTREACH (OUTPATIENT)
Dept: ADMINISTRATIVE | Facility: HOSPITAL | Age: 53
End: 2023-09-05
Payer: COMMERCIAL

## 2024-02-02 ENCOUNTER — LAB VISIT (OUTPATIENT)
Dept: LAB | Facility: HOSPITAL | Age: 54
End: 2024-02-02
Attending: FAMILY MEDICINE
Payer: COMMERCIAL

## 2024-02-02 ENCOUNTER — OFFICE VISIT (OUTPATIENT)
Dept: FAMILY MEDICINE | Facility: CLINIC | Age: 54
End: 2024-02-02
Payer: COMMERCIAL

## 2024-02-02 VITALS
HEIGHT: 77 IN | DIASTOLIC BLOOD PRESSURE: 76 MMHG | HEART RATE: 62 BPM | BODY MASS INDEX: 24.93 KG/M2 | WEIGHT: 211.19 LBS | SYSTOLIC BLOOD PRESSURE: 110 MMHG

## 2024-02-02 DIAGNOSIS — Z00.00 WELLNESS EXAMINATION: Primary | ICD-10-CM

## 2024-02-02 DIAGNOSIS — Z23 IMMUNIZATION DUE: ICD-10-CM

## 2024-02-02 DIAGNOSIS — Z12.11 COLON CANCER SCREENING: ICD-10-CM

## 2024-02-02 DIAGNOSIS — Z00.00 WELLNESS EXAMINATION: ICD-10-CM

## 2024-02-02 LAB
BASOPHILS # BLD AUTO: 0.02 K/UL (ref 0–0.2)
BASOPHILS NFR BLD: 0.4 % (ref 0–1.9)
DIFFERENTIAL METHOD BLD: ABNORMAL
EOSINOPHIL # BLD AUTO: 0.1 K/UL (ref 0–0.5)
EOSINOPHIL NFR BLD: 1.6 % (ref 0–8)
ERYTHROCYTE [DISTWIDTH] IN BLOOD BY AUTOMATED COUNT: 13 % (ref 11.5–14.5)
HCT VFR BLD AUTO: 44 % (ref 40–54)
HGB BLD-MCNC: 14 G/DL (ref 14–18)
IMM GRANULOCYTES # BLD AUTO: 0.01 K/UL (ref 0–0.04)
IMM GRANULOCYTES NFR BLD AUTO: 0.2 % (ref 0–0.5)
LYMPHOCYTES # BLD AUTO: 1.8 K/UL (ref 1–4.8)
LYMPHOCYTES NFR BLD: 35 % (ref 18–48)
MCH RBC QN AUTO: 26.9 PG (ref 27–31)
MCHC RBC AUTO-ENTMCNC: 31.8 G/DL (ref 32–36)
MCV RBC AUTO: 85 FL (ref 82–98)
MONOCYTES # BLD AUTO: 0.4 K/UL (ref 0.3–1)
MONOCYTES NFR BLD: 7.9 % (ref 4–15)
NEUTROPHILS # BLD AUTO: 2.8 K/UL (ref 1.8–7.7)
NEUTROPHILS NFR BLD: 54.9 % (ref 38–73)
NRBC BLD-RTO: 0 /100 WBC
PLATELET # BLD AUTO: 239 K/UL (ref 150–450)
PMV BLD AUTO: 12.5 FL (ref 9.2–12.9)
RBC # BLD AUTO: 5.2 M/UL (ref 4.6–6.2)
WBC # BLD AUTO: 5.09 K/UL (ref 3.9–12.7)

## 2024-02-02 PROCEDURE — 99396 PREV VISIT EST AGE 40-64: CPT | Mod: S$GLB,,, | Performed by: FAMILY MEDICINE

## 2024-02-02 PROCEDURE — 3078F DIAST BP <80 MM HG: CPT | Mod: CPTII,S$GLB,, | Performed by: FAMILY MEDICINE

## 2024-02-02 PROCEDURE — 1160F RVW MEDS BY RX/DR IN RCRD: CPT | Mod: CPTII,S$GLB,, | Performed by: FAMILY MEDICINE

## 2024-02-02 PROCEDURE — 85025 COMPLETE CBC W/AUTO DIFF WBC: CPT | Performed by: FAMILY MEDICINE

## 2024-02-02 PROCEDURE — 84443 ASSAY THYROID STIM HORMONE: CPT | Performed by: FAMILY MEDICINE

## 2024-02-02 PROCEDURE — 84153 ASSAY OF PSA TOTAL: CPT | Performed by: FAMILY MEDICINE

## 2024-02-02 PROCEDURE — 36415 COLL VENOUS BLD VENIPUNCTURE: CPT | Mod: PN | Performed by: FAMILY MEDICINE

## 2024-02-02 PROCEDURE — 3074F SYST BP LT 130 MM HG: CPT | Mod: CPTII,S$GLB,, | Performed by: FAMILY MEDICINE

## 2024-02-02 PROCEDURE — 83036 HEMOGLOBIN GLYCOSYLATED A1C: CPT | Performed by: FAMILY MEDICINE

## 2024-02-02 PROCEDURE — 80061 LIPID PANEL: CPT | Performed by: FAMILY MEDICINE

## 2024-02-02 PROCEDURE — 80053 COMPREHEN METABOLIC PANEL: CPT | Performed by: FAMILY MEDICINE

## 2024-02-02 PROCEDURE — 84439 ASSAY OF FREE THYROXINE: CPT | Performed by: FAMILY MEDICINE

## 2024-02-02 PROCEDURE — 99999 PR PBB SHADOW E&M-EST. PATIENT-LVL III: CPT | Mod: PBBFAC,,, | Performed by: FAMILY MEDICINE

## 2024-02-02 PROCEDURE — 1159F MED LIST DOCD IN RCRD: CPT | Mod: CPTII,S$GLB,, | Performed by: FAMILY MEDICINE

## 2024-02-02 PROCEDURE — 3008F BODY MASS INDEX DOCD: CPT | Mod: CPTII,S$GLB,, | Performed by: FAMILY MEDICINE

## 2024-02-02 NOTE — PROGRESS NOTES
THIS DOCUMENT WAS MADE IN PART WITH VOICE RECOGNITION SOFTWARE.  OCCASIONALLY THIS SOFTWARE WILL MISINTERPRET WORDS OR PHRASES.    Assessment and Plan:    1. Wellness examination  Comprehensive Metabolic Panel    Lipid Panel    CBC Auto Differential    Hemoglobin A1C    TSH    T4, Free    PSA, Screening    Urinalysis, Reflex to Urine Culture Urine, Clean Catch    Urinalysis Microscopic      2. Immunization due        3. Colon cancer screening  Case Request Endoscopy: COLONOSCOPY          PLAN    Wellness labs as above     Ordered screening colonoscopy      ______________________________________________________________________  Subjective:    Chief Complaint:  Chief Complaint   Patient presents with    Annual Exam        HPI:  Cornell is a 53 y.o. year old       Patient presents today for annual wellness exam     No specific complaints or concerns     Fairly healthy, exercises regularly      Past Medical History:  History reviewed. No pertinent past medical history.    Past Surgical History:  History reviewed. No pertinent surgical history.    Family History:  Family History   Problem Relation Age of Onset    Breast cancer Mother     Lung cancer Mother     Cancer Mother         Breast cancer,  lung cancer    COPD Mother     Prostate cancer Father     Cancer Father         Prostate Cancer    Stroke Father         Has vascular dementia caused by mini strokes.    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:    Tobacco Use    Smoking status: Never    Smokeless tobacco: Never   Substance and  "Sexual Activity    Alcohol use: Not Currently     Alcohol/week: 1.0 standard drink of alcohol     Types: 1 Cans of beer per week     Comment: pt states occasionally one one drink per week     Drug use: Never    Sexual activity: Not Currently     Partners: Female     Birth control/protection: Condom   Social History Narrative    Job :  [employer]     Exercise : Running 3-5 miles + 3x per week     Diet : No restriction, "watch what I eat"     Kid : 17     Social Determinants of Health     Financial Resource Strain: Unknown (10/13/2022)    Overall Financial Resource Strain (CARDIA)     Difficulty of Paying Living Expenses: Patient declined   Food Insecurity: No Food Insecurity (10/13/2022)    Hunger Vital Sign     Worried About Running Out of Food in the Last Year: Never true     Ran Out of Food in the Last Year: Never true   Transportation Needs: No Transportation Needs (10/13/2022)    PRAPARE - Transportation     Lack of Transportation (Medical): No     Lack of Transportation (Non-Medical): No   Physical Activity: Insufficiently Active (2/2/2024)    Exercise Vital Sign     Days of Exercise per Week: 3 days     Minutes of Exercise per Session: 40 min   Stress: No Stress Concern Present (2/2/2024)    [employer] - Occupational Stress Questionnaire     Feeling of Stress : Not at all   Social Connections: Unknown (10/13/2022)    Social Connection and Isolation Panel [NHANES]     Frequency of Communication with Friends and Family: More than three times a week     Frequency of Social Gatherings with Friends and Family: More than three times a week     Active Member of Clubs or Organizations: Yes     Attends Club or Organization Meetings: 1 to 4 times per year     Marital Status:    Housing Stability: Low Risk  (10/13/2022)    Housing Stability Vital Sign     Unable to Pay for Housing in the Last Year: No     Number of Places Lived in the Last Year: 1     Unstable Housing in " "the Last Year: No       Medications:  Current Outpatient Medications on File Prior to Visit   Medication Sig Dispense Refill    ergocalciferol, vitamin D2, (VITAMIN D ORAL) Take by mouth once daily.      LYSINE ORAL Take 1 Dose by mouth once daily.      multivitamin capsule Take 1 capsule by mouth once daily.       No current facility-administered medications on file prior to visit.       Allergies:  Motrin [ibuprofen]    Immunizations:  Immunization History   Administered Date(s) Administered    COVID-19, vector-nr, rS-Ad26, PF ([employer]) 01/06/2022    Influenza - Quadrivalent - PF *Preferred* (6 months and older) 10/14/2022    Tdap 07/20/2018       Review of Systems:  Review of Systems   Constitutional:  Negative for activity change and unexpected weight change.   HENT:  Negative for hearing loss, rhinorrhea and trouble swallowing.    Eyes:  Negative for discharge and visual disturbance.   Respiratory:  Negative for chest tightness and wheezing.    Cardiovascular:  Negative for chest pain and palpitations.   Gastrointestinal:  Negative for blood in stool, constipation, diarrhea and vomiting.   Endocrine: Negative for polydipsia and polyuria.   Genitourinary:  Negative for difficulty urinating, hematuria and urgency.   Musculoskeletal:  Negative for arthralgias, joint swelling and neck pain.   Neurological:  Negative for weakness and headaches.   Psychiatric/Behavioral:  Negative for confusion and dysphoric mood.    All other systems reviewed and are negative.      Objective:    Vitals:  Vitals:    02/02/24 0945   BP: 110/76   Pulse: 62   Weight: 95.8 kg (211 lb 3.2 oz)   Height: 6' 5" (1.956 m)   PainSc: 0-No pain       Physical Exam  Vitals reviewed.   Constitutional:       General: He is not in acute distress.  HENT:      Head: Normocephalic and atraumatic.   Eyes:      Pupils: Pupils are equal, round, and reactive to light.   Cardiovascular:      Rate and Rhythm: Normal rate and regular rhythm.      Heart " sounds: No murmur heard.     No friction rub.   Pulmonary:      Effort: Pulmonary effort is normal.      Breath sounds: Normal breath sounds.   Abdominal:      General: Bowel sounds are normal. There is no distension.      Palpations: Abdomen is soft.      Tenderness: There is no abdominal tenderness.   Musculoskeletal:      Cervical back: Neck supple.   Skin:     General: Skin is warm and dry.      Findings: No rash.   Psychiatric:         Behavior: Behavior normal.           Gerber Chi MD  Family Medicine

## 2024-02-03 LAB
ALBUMIN SERPL BCP-MCNC: 4.2 G/DL (ref 3.5–5.2)
ALP SERPL-CCNC: 81 U/L (ref 55–135)
ALT SERPL W/O P-5'-P-CCNC: 15 U/L (ref 10–44)
ANION GAP SERPL CALC-SCNC: 10 MMOL/L (ref 8–16)
AST SERPL-CCNC: 19 U/L (ref 10–40)
BILIRUB SERPL-MCNC: 0.8 MG/DL (ref 0.1–1)
BUN SERPL-MCNC: 19 MG/DL (ref 6–20)
CALCIUM SERPL-MCNC: 9.6 MG/DL (ref 8.7–10.5)
CHLORIDE SERPL-SCNC: 106 MMOL/L (ref 95–110)
CHOLEST SERPL-MCNC: 185 MG/DL (ref 120–199)
CHOLEST/HDLC SERPL: 3.1 {RATIO} (ref 2–5)
CO2 SERPL-SCNC: 25 MMOL/L (ref 23–29)
COMPLEXED PSA SERPL-MCNC: 4.7 NG/ML (ref 0–4)
CREAT SERPL-MCNC: 0.9 MG/DL (ref 0.5–1.4)
EST. GFR  (NO RACE VARIABLE): >60 ML/MIN/1.73 M^2
ESTIMATED AVG GLUCOSE: 103 MG/DL (ref 68–131)
GLUCOSE SERPL-MCNC: 85 MG/DL (ref 70–110)
HBA1C MFR BLD: 5.2 % (ref 4–5.6)
HDLC SERPL-MCNC: 60 MG/DL (ref 40–75)
HDLC SERPL: 32.4 % (ref 20–50)
LDLC SERPL CALC-MCNC: 110.2 MG/DL (ref 63–159)
NONHDLC SERPL-MCNC: 125 MG/DL
POTASSIUM SERPL-SCNC: 5.3 MMOL/L (ref 3.5–5.1)
PROT SERPL-MCNC: 7.4 G/DL (ref 6–8.4)
SODIUM SERPL-SCNC: 141 MMOL/L (ref 136–145)
T4 FREE SERPL-MCNC: 1.02 NG/DL (ref 0.71–1.51)
TRIGL SERPL-MCNC: 74 MG/DL (ref 30–150)
TSH SERPL DL<=0.005 MIU/L-ACNC: 1.55 UIU/ML (ref 0.4–4)

## 2024-02-05 DIAGNOSIS — R80.0 ISOLATED PROTEINURIA WITHOUT SPECIFIC MORPHOLOGIC LESION: ICD-10-CM

## 2024-02-05 DIAGNOSIS — R97.20 ELEVATED PSA: Primary | ICD-10-CM

## 2024-02-07 ENCOUNTER — TELEPHONE (OUTPATIENT)
Dept: GASTROENTEROLOGY | Facility: CLINIC | Age: 54
End: 2024-02-07
Payer: COMMERCIAL

## 2024-02-08 ENCOUNTER — PATIENT MESSAGE (OUTPATIENT)
Dept: FAMILY MEDICINE | Facility: CLINIC | Age: 54
End: 2024-02-08
Payer: COMMERCIAL

## 2024-02-08 DIAGNOSIS — N41.9 PROSTATITIS, UNSPECIFIED PROSTATITIS TYPE: Primary | ICD-10-CM

## 2024-02-08 DIAGNOSIS — R97.20 ELEVATED PSA: ICD-10-CM

## 2024-02-09 RX ORDER — CIPROFLOXACIN 500 MG/1
500 TABLET ORAL EVERY 12 HOURS
Qty: 20 TABLET | Refills: 0 | Status: SHIPPED | OUTPATIENT
Start: 2024-02-09 | End: 2024-02-09

## 2024-02-09 RX ORDER — CIPROFLOXACIN 500 MG/1
500 TABLET ORAL EVERY 12 HOURS
Qty: 20 TABLET | Refills: 0 | Status: SHIPPED | OUTPATIENT
Start: 2024-02-09 | End: 2024-04-08

## 2024-04-04 NOTE — PROGRESS NOTES
Subjective:      Cornell Gordon is a 53 y.o. male who presents today regarding his elevated psa.      Father had pca in his 60s  Treated with XRT  Maternal grand father also had pca      Was treated with antibiotics by PCP recently    Recently rad a marathon in [place]    Stream is weak  Weaker after having caffeinate beverages    Nocturia*2  AUASS mild symptoms; mostly satsified    The following portions of the patient's history were reviewed and updated as appropriate: allergies, current medications, past family history, past medical history, past social history, past surgical history and problem list.    Review of Systems  Pertinent items are noted in HPI.  A comprehensive multipoint review of systems was negative except as otherwise stated in the HPI.    No past medical history on file.  No past surgical history on file.    Review of patient's allergies indicates:   Allergen Reactions    Motrin [ibuprofen] Hives          Objective:   Vitals: There were no vitals taken for this visit.    Physical Exam   General: alert and oriented, no acute distress  Respiratory: Symmetric expansion, non-labored breathing  Cardiovascular: no peripheral edema  Abdomen: soft, non distended  Skin: normal coloration and turgor, no rashes, no suspicious skin lesions noted  Neuro: no gross deficits  Psych: normal judgment and insight, normal mood/affect, and non-anxious  OTILIO 45-50g   Soft  No nodules    Physical Exam    Lab Review   Urinalysis demonstrates negative for all components except trace protein    Lab Results   Component Value Date    WBC 5.09 02/02/2024    HGB 14.0 02/02/2024    HCT 44.0 02/02/2024    MCV 85 02/02/2024     02/02/2024     Lab Results   Component Value Date    CREATININE 0.9 02/02/2024    BUN 19 02/02/2024     Lab Results   Component Value Date    PSA 4.7 (H) 02/02/2024    PSA 3.7 10/14/2022    PSA 2.5 08/13/2020         Imaging  -    Assessment and Plan:   Elevated PSA  BMP  MRI  RTC after above  to see Dr Jung    Enlarged prostate  flomax

## 2024-04-05 ENCOUNTER — LAB VISIT (OUTPATIENT)
Dept: LAB | Facility: OTHER | Age: 54
End: 2024-04-05
Attending: FAMILY MEDICINE
Payer: COMMERCIAL

## 2024-04-05 ENCOUNTER — TELEPHONE (OUTPATIENT)
Dept: UROLOGY | Facility: CLINIC | Age: 54
End: 2024-04-05

## 2024-04-05 ENCOUNTER — OFFICE VISIT (OUTPATIENT)
Dept: UROLOGY | Facility: CLINIC | Age: 54
End: 2024-04-05
Payer: COMMERCIAL

## 2024-04-05 VITALS
BODY MASS INDEX: 24.91 KG/M2 | SYSTOLIC BLOOD PRESSURE: 121 MMHG | WEIGHT: 211 LBS | HEIGHT: 77 IN | OXYGEN SATURATION: 100 % | DIASTOLIC BLOOD PRESSURE: 75 MMHG | RESPIRATION RATE: 12 BRPM | HEART RATE: 56 BPM | TEMPERATURE: 98 F

## 2024-04-05 DIAGNOSIS — N40.0 ENLARGED PROSTATE: ICD-10-CM

## 2024-04-05 DIAGNOSIS — R97.20 ELEVATED PSA: Primary | ICD-10-CM

## 2024-04-05 DIAGNOSIS — R97.20 ELEVATED PSA: ICD-10-CM

## 2024-04-05 DIAGNOSIS — R80.0 ISOLATED PROTEINURIA WITHOUT SPECIFIC MORPHOLOGIC LESION: ICD-10-CM

## 2024-04-05 LAB
ANION GAP SERPL CALC-SCNC: 10 MMOL/L (ref 8–16)
BILIRUB UR QL STRIP: NEGATIVE
BUN SERPL-MCNC: 14 MG/DL (ref 6–20)
CALCIUM SERPL-MCNC: 9.6 MG/DL (ref 8.7–10.5)
CHLORIDE SERPL-SCNC: 104 MMOL/L (ref 95–110)
CLARITY UR: CLEAR
CO2 SERPL-SCNC: 27 MMOL/L (ref 23–29)
COLOR UR: COLORLESS
CREAT SERPL-MCNC: 1 MG/DL (ref 0.5–1.4)
CREAT UR-MCNC: 17.7 MG/DL (ref 23–375)
EST. GFR  (NO RACE VARIABLE): >60 ML/MIN/1.73 M^2
GLUCOSE SERPL-MCNC: 83 MG/DL (ref 70–110)
GLUCOSE UR QL STRIP: NEGATIVE
HGB UR QL STRIP: NEGATIVE
KETONES UR QL STRIP: NEGATIVE
LEUKOCYTE ESTERASE UR QL STRIP: NEGATIVE
NITRITE UR QL STRIP: NEGATIVE
PH UR STRIP: 7 [PH] (ref 5–8)
POTASSIUM SERPL-SCNC: 4.7 MMOL/L (ref 3.5–5.1)
PROSTATE SPECIFIC ANTIGEN, TOTAL: 6.3 NG/ML (ref 0–4)
PROT UR QL STRIP: ABNORMAL
PROT UR-MCNC: 8 MG/DL (ref 0–15)
PROT/CREAT UR: 0.45 MG/G{CREAT} (ref 0–0.2)
PSA FREE MFR SERPL: 20.48 %
PSA FREE SERPL-MCNC: 1.29 NG/ML (ref 0–1.5)
SODIUM SERPL-SCNC: 141 MMOL/L (ref 136–145)
SP GR UR STRIP: <1.005 (ref 1–1.03)
URN SPEC COLLECT METH UR: ABNORMAL

## 2024-04-05 PROCEDURE — 81003 URINALYSIS AUTO W/O SCOPE: CPT | Performed by: FAMILY MEDICINE

## 2024-04-05 PROCEDURE — 3074F SYST BP LT 130 MM HG: CPT | Mod: CPTII,S$GLB,, | Performed by: UROLOGY

## 2024-04-05 PROCEDURE — 3078F DIAST BP <80 MM HG: CPT | Mod: CPTII,S$GLB,, | Performed by: UROLOGY

## 2024-04-05 PROCEDURE — 84154 ASSAY OF PSA FREE: CPT | Performed by: FAMILY MEDICINE

## 2024-04-05 PROCEDURE — 3008F BODY MASS INDEX DOCD: CPT | Mod: CPTII,S$GLB,, | Performed by: UROLOGY

## 2024-04-05 PROCEDURE — 3044F HG A1C LEVEL LT 7.0%: CPT | Mod: CPTII,S$GLB,, | Performed by: UROLOGY

## 2024-04-05 PROCEDURE — 1159F MED LIST DOCD IN RCRD: CPT | Mod: CPTII,S$GLB,, | Performed by: UROLOGY

## 2024-04-05 PROCEDURE — 99204 OFFICE O/P NEW MOD 45 MIN: CPT | Mod: S$GLB,,, | Performed by: UROLOGY

## 2024-04-05 PROCEDURE — 84156 ASSAY OF PROTEIN URINE: CPT | Performed by: FAMILY MEDICINE

## 2024-04-05 PROCEDURE — 80048 BASIC METABOLIC PNL TOTAL CA: CPT | Performed by: UROLOGY

## 2024-04-05 PROCEDURE — 36415 COLL VENOUS BLD VENIPUNCTURE: CPT | Performed by: FAMILY MEDICINE

## 2024-04-05 RX ORDER — TAMSULOSIN HYDROCHLORIDE 0.4 MG/1
0.4 CAPSULE ORAL DAILY
Qty: 30 CAPSULE | Refills: 11 | Status: SHIPPED | OUTPATIENT
Start: 2024-04-05 | End: 2024-06-03

## 2024-04-05 NOTE — TELEPHONE ENCOUNTER
----- Message from Jason Jung MD sent at 4/5/2024 11:43 AM CDT -----  MRI prostate then see Dr Jung next available

## 2024-04-08 ENCOUNTER — TELEPHONE (OUTPATIENT)
Dept: FAMILY MEDICINE | Facility: CLINIC | Age: 54
End: 2024-04-08
Payer: COMMERCIAL

## 2024-04-08 DIAGNOSIS — R97.20 ELEVATED PSA: Primary | ICD-10-CM

## 2024-04-08 NOTE — TELEPHONE ENCOUNTER
----- Message from Gerber Chi MD sent at 4/8/2024  7:21 AM CDT -----  Call patient, help schedule urology

## 2024-05-03 ENCOUNTER — HOSPITAL ENCOUNTER (OUTPATIENT)
Dept: RADIOLOGY | Facility: HOSPITAL | Age: 54
Discharge: HOME | End: 2024-05-03
Attending: UROLOGY
Payer: COMMERCIAL

## 2024-05-03 DIAGNOSIS — R97.20 ELEVATED PSA: ICD-10-CM

## 2024-05-03 DIAGNOSIS — N40.0 ENLARGED PROSTATE: ICD-10-CM

## 2024-05-03 PROCEDURE — 72197 MRI PELVIS W/O & W/DYE: CPT | Mod: 26,,, | Performed by: RADIOLOGY

## 2024-05-03 PROCEDURE — 25500020 PHARM REV CODE 255: Performed by: UROLOGY

## 2024-05-03 PROCEDURE — 72197 MRI PELVIS W/O & W/DYE: CPT | Mod: TC

## 2024-05-03 PROCEDURE — A9585 GADOBUTROL INJECTION: HCPCS | Performed by: UROLOGY

## 2024-05-03 RX ORDER — GADOBUTROL 604.72 MG/ML
10 INJECTION INTRAVENOUS
Status: COMPLETED | OUTPATIENT
Start: 2024-05-03 | End: 2024-05-03

## 2024-05-03 RX ADMIN — GADOBUTROL 10 ML: 604.72 INJECTION INTRAVENOUS at 06:05

## 2024-05-13 NOTE — PROGRESS NOTES
Subjective:      Cornell Gordon is a 53 y.o. male who returns today regarding his       Father had pca in his 60s  Treated with XRT  Maternal grand father also had pca        Was treated with antibiotics by PCP recently     Recently rad a marathon in [place]     Stream is weak  Weaker after having caffeinate beverages     Nocturia*2  AUASS mild symptoms; mostly satsified.    The following portions of the patient's history were reviewed and updated as appropriate: allergies, current medications, past family history, past medical history, past social history, past surgical history and problem list.    Review of Systems  Pertinent items are noted in HPI.  A comprehensive multipoint review of systems was negative except as otherwise stated in the HPI.    No past medical history on file.  No past surgical history on file.    Review of patient's allergies indicates:   Allergen Reactions    Motrin [ibuprofen] Hives          Objective:   Vitals: There were no vitals taken for this visit.    Physical Exam   General: alert and oriented, no acute distress  Respiratory: Symmetric expansion, non-labored breathing  Cardiovascular: no peripheral edema  Abdomen: soft, non distended  Skin: normal coloration and turgor, no rashes, no suspicious skin lesions noted  Neuro: no gross deficits  Psych: normal judgment and insight, normal mood/affect, and non-anxious    Physical Exam    Lab Review   Urinalysis demonstrates no specimen    Lab Results   Component Value Date    WBC 5.09 02/02/2024    HGB 14.0 02/02/2024    HCT 44.0 02/02/2024    MCV 85 02/02/2024     02/02/2024     Lab Results   Component Value Date    CREATININE 1.0 04/05/2024    BUN 14 04/05/2024     Lab Results   Component Value Date    PSA 4.7 (H) 02/02/2024 psa density 0.08 normal    PSA 3.7 10/14/2022    PSA 2.5 08/13/2020    PSATOTAL 6.3 (H) 04/05/2024    PSAFREE 1.29 04/05/2024    PSAFREEPCT 20.48 04/05/2024         Imaging  MRI PROSTATE W W/O CONTRAST      CLINICAL HISTORY:  Prostate cancer suspected;psa 4.7;  Elevated prostate specific antigen (PSA)     Additional history: None provided.     TECHNIQUE:  Multiparametric multiplanar, multisequence images including high resolution, small field-of-view T2-WI; axial diffusion weighted images with multiple B-values and creation of ADC-maps; and dynamic contrast enhanced T1-weighted images through the prostate were obtained before, during, and after the administration of 10 cc intravenous gadolinium.     COMPARISON:  None.     FINDINGS:  Previous biopsy: None.     PSA: 6.3 ng/mL 04/05/2024     Prior therapy: None     Prostate: 5.4 x 4.4 x 4.9 cm corresponding to a computed volume of 57.8 cc.     Peripheral zone: Few linear areas of T2 hypointense signal bilaterally, score 2     Lesion ([facility]ARA) #P-1     Location: Side: Left; Region: Mid to apex; Zone: posterior peripheral zone laterally     Greatest dimension: 1.2 cm     T2-WI: Heterogeneous signal intensity or non-circumscribed, rounded, moderate hypointensity, score 3.     DWI/ADC: Focal (discrete and different from the background) hypointense on ADC and/or focal hyperintense on high b-value DWI; may be markedly hypointense on ADC or markedly hyperintense on high b-value DWI, but not both, score 3.     DCE: Negative     Extraprostatic extension: Negative     PI-RADS assessment category: 3     Transitional zone: Benign prostatic hyperplasia without focal suspicious abnormality, score 2.     Neurovascular bundle: Normal appearance.     Seminal vesicles: Normal appearance.     Adjacent Organ Involvement: No evidence for urinary bladder or rectal invasion.     Lymphadenopathy: None.     Other Findings: None.     Impression:     1. Left peripheral zone lesion with intermediate risk of clinically significant prostate cancer.  2. Benign prostatic hyperplasia.  Overall Assessment: PI-RADS 3 - Intermediate (the presence of clinically significant cancer is equivocal)     Number of  targets created for potential MR/US fusion biopsy     Peripheral zone: 1     Transition zone: 0        Electronically signed by:Gavin Christianson MD  Date:                                            05/04/2024  Time:                                           10:33  Assessment and Plan:   Elevated PSA  Normal PSA density & PIRADS3  Discussed observation vs biopsy  Given his family history, he prefers [facility] biopsy      Enlarged prostate 59g  Flomax    Fam hist of prostate cancer

## 2024-05-14 ENCOUNTER — PATIENT MESSAGE (OUTPATIENT)
Dept: UROLOGY | Facility: CLINIC | Age: 54
End: 2024-05-14

## 2024-05-14 ENCOUNTER — OFFICE VISIT (OUTPATIENT)
Dept: UROLOGY | Facility: CLINIC | Age: 54
End: 2024-05-14
Payer: COMMERCIAL

## 2024-05-14 VITALS
WEIGHT: 211 LBS | HEIGHT: 77 IN | DIASTOLIC BLOOD PRESSURE: 77 MMHG | HEART RATE: 62 BPM | SYSTOLIC BLOOD PRESSURE: 116 MMHG | BODY MASS INDEX: 24.91 KG/M2

## 2024-05-14 DIAGNOSIS — R97.20 ELEVATED PSA: Primary | ICD-10-CM

## 2024-05-14 LAB
BILIRUB UR QL STRIP: NEGATIVE
CLARITY UR REFRACT.AUTO: CLEAR
COLOR UR AUTO: YELLOW
GLUCOSE UR QL STRIP: NEGATIVE
HGB UR QL STRIP: NEGATIVE
KETONES UR QL STRIP: NEGATIVE
LEUKOCYTE ESTERASE UR QL STRIP: NEGATIVE
NITRITE UR QL STRIP: NEGATIVE
PH UR STRIP: 7 [PH] (ref 5–8)
PROT UR QL STRIP: ABNORMAL
SP GR UR STRIP: 1 (ref 1–1.03)
URN SPEC COLLECT METH UR: ABNORMAL

## 2024-05-14 PROCEDURE — 99214 OFFICE O/P EST MOD 30 MIN: CPT | Mod: S$GLB,,, | Performed by: UROLOGY

## 2024-05-14 PROCEDURE — 3008F BODY MASS INDEX DOCD: CPT | Mod: CPTII,S$GLB,, | Performed by: UROLOGY

## 2024-05-14 PROCEDURE — 3074F SYST BP LT 130 MM HG: CPT | Mod: CPTII,S$GLB,, | Performed by: UROLOGY

## 2024-05-14 PROCEDURE — 1159F MED LIST DOCD IN RCRD: CPT | Mod: CPTII,S$GLB,, | Performed by: UROLOGY

## 2024-05-14 PROCEDURE — 3044F HG A1C LEVEL LT 7.0%: CPT | Mod: CPTII,S$GLB,, | Performed by: UROLOGY

## 2024-05-14 PROCEDURE — 81003 URINALYSIS AUTO W/O SCOPE: CPT | Performed by: UROLOGY

## 2024-05-14 PROCEDURE — 3078F DIAST BP <80 MM HG: CPT | Mod: CPTII,S$GLB,, | Performed by: UROLOGY

## 2024-05-14 RX ORDER — CIPROFLOXACIN 500 MG/1
500 TABLET ORAL 2 TIMES DAILY
Qty: 4 TABLET | Refills: 0 | Status: SHIPPED | OUTPATIENT
Start: 2024-05-14 | End: 2024-05-16

## 2024-06-07 ENCOUNTER — PATIENT MESSAGE (OUTPATIENT)
Dept: UROLOGY | Facility: CLINIC | Age: 54
End: 2024-06-07
Payer: COMMERCIAL

## 2024-06-07 ENCOUNTER — ANESTHESIA (OUTPATIENT)
Dept: ENDOSCOPY | Facility: HOSPITAL | Age: 54
End: 2024-06-07
Payer: COMMERCIAL

## 2024-06-07 ENCOUNTER — HOSPITAL ENCOUNTER (OUTPATIENT)
Facility: HOSPITAL | Age: 54
Discharge: HOME | End: 2024-06-07
Attending: SURGERY | Admitting: SURGERY
Payer: COMMERCIAL

## 2024-06-07 ENCOUNTER — PATIENT MESSAGE (OUTPATIENT)
Dept: ENDOSCOPY | Facility: HOSPITAL | Age: 54
End: 2024-06-07
Payer: COMMERCIAL

## 2024-06-07 ENCOUNTER — ANESTHESIA EVENT (OUTPATIENT)
Dept: ENDOSCOPY | Facility: HOSPITAL | Age: 54
End: 2024-06-07
Payer: COMMERCIAL

## 2024-06-07 DIAGNOSIS — Z12.11 COLON CANCER SCREENING: ICD-10-CM

## 2024-06-07 DIAGNOSIS — Z12.11 ENCOUNTER FOR SCREENING COLONOSCOPY: Primary | ICD-10-CM

## 2024-06-07 PROCEDURE — 37000009 HC ANESTHESIA EA ADD 15 MINS: Mod: PO | Performed by: SURGERY

## 2024-06-07 PROCEDURE — 37000008 HC ANESTHESIA 1ST 15 MINUTES: Mod: PO | Performed by: SURGERY

## 2024-06-07 PROCEDURE — 25000003 PHARM REV CODE 250: Mod: PO | Performed by: NURSE ANESTHETIST, CERTIFIED REGISTERED

## 2024-06-07 PROCEDURE — G0121 COLON CA SCRN NOT HI RSK IND: HCPCS | Mod: ,,, | Performed by: SURGERY

## 2024-06-07 PROCEDURE — G0121 COLON CA SCRN NOT HI RSK IND: HCPCS | Mod: PO | Performed by: SURGERY

## 2024-06-07 PROCEDURE — 63600175 PHARM REV CODE 636 W HCPCS: Mod: PO | Performed by: SURGERY

## 2024-06-07 PROCEDURE — 63600175 PHARM REV CODE 636 W HCPCS: Mod: PO | Performed by: NURSE ANESTHETIST, CERTIFIED REGISTERED

## 2024-06-07 RX ORDER — SODIUM CHLORIDE, SODIUM LACTATE, POTASSIUM CHLORIDE, CALCIUM CHLORIDE 600; 310; 30; 20 MG/100ML; MG/100ML; MG/100ML; MG/100ML
INJECTION, SOLUTION INTRAVENOUS CONTINUOUS
Status: DISCONTINUED | OUTPATIENT
Start: 2024-06-07 | End: 2024-06-07 | Stop reason: HOSPADM

## 2024-06-07 RX ORDER — PROPOFOL 10 MG/ML
VIAL (ML) INTRAVENOUS
Status: DISCONTINUED | OUTPATIENT
Start: 2024-06-07 | End: 2024-06-07

## 2024-06-07 RX ORDER — LIDOCAINE HYDROCHLORIDE 20 MG/ML
INJECTION INTRAVENOUS
Status: DISCONTINUED | OUTPATIENT
Start: 2024-06-07 | End: 2024-06-07

## 2024-06-07 RX ADMIN — PROPOFOL 50 MG: 10 INJECTION, EMULSION INTRAVENOUS at 09:06

## 2024-06-07 RX ADMIN — LIDOCAINE HYDROCHLORIDE 75 MG: 20 INJECTION INTRAVENOUS at 09:06

## 2024-06-07 RX ADMIN — SODIUM CHLORIDE, POTASSIUM CHLORIDE, SODIUM LACTATE AND CALCIUM CHLORIDE: 600; 310; 30; 20 INJECTION, SOLUTION INTRAVENOUS at 09:06

## 2024-06-07 RX ADMIN — PROPOFOL 40 MG: 10 INJECTION, EMULSION INTRAVENOUS at 09:06

## 2024-06-07 RX ADMIN — PROPOFOL 120 MG: 10 INJECTION, EMULSION INTRAVENOUS at 09:06

## 2024-06-07 NOTE — H&P
[facility] - Endoscopy  History & Physical     Subjective:     Chief Complaint/Reason for Admission: screening colonoscopy    History of Present Illness:   Patient 53 y.o. male presents for screening colonoscoyp.  Pt notes that he has never had a cscope. He denies abd pain or changes in bowel habits.  He has no significant PMhx or PShx.       There are no problems to display for this patient.       Medications Prior to Admission   Medication Sig Dispense Refill Last Dose    ergocalciferol, vitamin D2, (VITAMIN D ORAL) Take by mouth once daily.   Past Week    LYSINE ORAL Take 1 Dose by mouth once daily.   Past Week    multivitamin capsule Take 1 capsule by mouth once daily.   Past Week    tamsulosin (FLOMAX) 0.4 mg Cap Take 1 capsule (0.4 mg total) by mouth once daily. (Patient not taking: Reported on 6/3/2024) 30 capsule 11 Not Taking     Review of patient's allergies indicates:   Allergen Reactions    Motrin [ibuprofen] Hives        History reviewed. No pertinent past medical history.   History reviewed. No pertinent surgical history.     Social History     Tobacco Use    Smoking status: Never    Smokeless tobacco: Never   Substance Use Topics    Alcohol use: Not Currently     Alcohol/week: 1.0 standard drink of alcohol     Types: 1 Cans of beer per week     Comment: pt states occasionally one one drink per week         Review of Systems:  A comprehensive review of systems was negative.    OBJECTIVE:     No data found.  AAOx3  Soft/nd/nt  No resp distress    Data Review:      ASSESSMENT/PLAN:     There are no hospital problems to display for this patient.    Screening cscope  Plan:  To have cscope today

## 2024-06-07 NOTE — PLAN OF CARE
Pt VSS, denies recent fever or illness. Belongings placed under stretcher, cellphone sent with family. Consents to be signed by pt. Pt ready for procedure.

## 2024-06-07 NOTE — TRANSFER OF CARE
"Anesthesia Transfer of Care Note    Patient: Cornell Gordon    Procedure(s) Performed: Procedure(s) (LRB):  COLONOSCOPY (N/A)    Patient location: PACU    Anesthesia Type: general    Transport from OR: Transported from OR on room air with adequate spontaneous ventilation    Post pain: adequate analgesia    Post assessment: no apparent anesthetic complications    Post vital signs: stable    Level of consciousness: awake and sedated    Nausea/Vomiting: no nausea/vomiting    Complications: none    Transfer of care protocol was followed      Last vitals: Visit Vitals  BP (!) 88/51 (BP Location: Left arm, Patient Position: Lying)   Pulse (!) 57   Temp 36.7 °C (98 °F) (Skin)   Resp 16   Ht 6' 5" (1.956 m)   Wt 95.3 kg (210 lb)   SpO2 99%   BMI 24.90 kg/m²     "

## 2024-06-07 NOTE — PROVATION PATIENT INSTRUCTIONS
Discharge Summary/Instructions after an Endoscopic Procedure  Patient Name: Cornell Gordon  Patient MRN: [id number]  Patient YOB: 1970 Friday, June 7, 2024  Sam Adorno MD  Dear patient,  As a result of recent federal legislation (The Federal Cures Act), you may   receive lab or pathology results from your procedure in your [facility]   account before your physician is able to contact you. Your physician or   their representative will relay the results to you with their   recommendations at their soonest availability.  Thank you,  RESTRICTIONS:  During your procedure today, you received medications for sedation.  These   medications may affect your judgment, balance and coordination.  Therefore,   for 24 hours, you have the following restrictions:   - DO NOT drive a car, operate machinery, make legal/financial decisions,   sign important papers or drink alcohol.    ACTIVITY:  Today: no heavy lifting, straining or running due to procedural   sedation/anesthesia.  The following day: return to full activity including work.  DIET:  Eat and drink normally unless instructed otherwise.     TREATMENT FOR COMMON SIDE EFFECTS:  - Mild abdominal pain, nausea, belching, bloating or excessive gas:  rest,   eat lightly and use a heating pad.  - Sore Throat: treat with throat lozenges and/or gargle with warm salt   water.  - Because air was used during the procedure, expelling large amounts of air   from your rectum or belching is normal.  - If a bowel prep was taken, you may not have a bowel movement for 1-3 days.    This is normal.  SYMPTOMS TO WATCH FOR AND REPORT TO YOUR PHYSICIAN:  1. Abdominal pain or bloating, other than gas cramps.  2. Chest pain.  3. Back pain.  4. Signs of infection such as: chills or fever occurring within 24 hours   after the procedure.  5. Rectal bleeding, which would show as bright red, maroon, or black stools.   (A tablespoon of blood from the rectum is not serious, especially if    hemorrhoids are present.)  6. Vomiting.  7. Weakness or dizziness.  GO DIRECTLY TO THE NEAREST EMERGENCY ROOM IF YOU HAVE ANY OF THE FOLLOWING:      Difficulty breathing              Chills and/or fever over 101 F   Persistent vomiting and/or vomiting blood   Severe abdominal pain   Severe chest pain   Black, tarry stools   Bleeding- more than one tablespoon   Any other symptom or condition that you feel may need urgent attention  Your doctor recommends these additional instructions:  If any biopsies were taken, your doctors clinic will contact you in 1 to 2   weeks with any results.  You are being discharged to home.   Resume your regular diet.   Continue your present medications.   Your physician has recommended a repeat colonoscopy in six months because   the bowel preparation was poor.  For questions, problems or results please call your physician - Sam Adorno MD at Work:  [phone].  EMERGENCY PHONE NUMBER: [phone], LAB RESULTS: [phone]  IF A COMPLICATION OR EMERGENCY SITUATION ARISES AND YOU ARE UNABLE TO REACH   YOUR PHYSICIAN - GO DIRECTLY TO THE EMERGENCY ROOM.  ___________________________________________  Nurse Signature  ___________________________________________  Patient/Designated Responsible Party Signature  Sam Adorno MD  6/7/2024 10:02:06 AM  This report has been verified and signed electronically.  Dear patient,  As a result of recent federal legislation (The Federal Cures Act), you may   receive lab or pathology results from your procedure in your [facility]   account before your physician is able to contact you. Your physician or   their representative will relay the results to you with their   recommendations at their soonest availability.  Thank you.  PROVATION

## 2024-06-07 NOTE — ANESTHESIA POSTPROCEDURE EVALUATION
Anesthesia Post Evaluation    Patient: Cornell Gordon    Procedure(s) Performed: Procedure(s) (LRB):  COLONOSCOPY (N/A)    Final Anesthesia Type: general      Patient location during evaluation: PACU  Patient participation: Yes- Able to Participate  Level of consciousness: awake and alert and oriented  Post-procedure vital signs: reviewed and stable  Pain management: adequate  Airway patency: patent    PONV status at discharge: No PONV  Anesthetic complications: no      Cardiovascular status: blood pressure returned to baseline and stable  Respiratory status: unassisted and spontaneous ventilation  Hydration status: euvolemic  Follow-up not needed.              Vitals Value Taken Time   BP 99/60 06/07/24 1015   Temp 36.7 °C (98 °F) 06/07/24 1000   Pulse 55 06/07/24 1015   Resp 16 06/07/24 1015   SpO2 100 % 06/07/24 1015         No case tracking events are documented in the log.      Pain/Jackie Score: Jackie Score: 4 (6/7/2024 10:00 AM)

## 2024-06-07 NOTE — ANESTHESIA PREPROCEDURE EVALUATION
06/07/2024  Cornell Gordon is a 53 y.o., male.      Pre-op Assessment    I have reviewed the Patient Summary Reports.     I have reviewed the Nursing Notes.       Review of Systems  Anesthesia Hx:  No problems with previous Anesthesia                    Physical Exam  General: Well nourished        Anesthesia Plan  Type of Anesthesia, risks & benefits discussed:    Anesthesia Type: Gen Natural Airway  Intra-op Monitoring Plan: Standard ASA Monitors  Induction:  IV  Informed Consent: Informed consent signed with the Patient and all parties understand the risks and agree with anesthesia plan.  All questions answered.   ASA Score: 1  Day of Surgery Review of History & Physical: H&P Update referred to the surgeon/provider.    Ready For Surgery From Anesthesia Perspective.     .

## 2024-06-10 ENCOUNTER — TELEPHONE (OUTPATIENT)
Dept: UROLOGY | Facility: CLINIC | Age: 54
End: 2024-06-10
Payer: COMMERCIAL

## 2024-06-10 VITALS
BODY MASS INDEX: 24.79 KG/M2 | WEIGHT: 210 LBS | OXYGEN SATURATION: 100 % | TEMPERATURE: 98 F | SYSTOLIC BLOOD PRESSURE: 101 MMHG | RESPIRATION RATE: 17 BRPM | HEART RATE: 67 BPM | DIASTOLIC BLOOD PRESSURE: 65 MMHG | HEIGHT: 77 IN

## 2024-06-14 ENCOUNTER — PROCEDURE VISIT (OUTPATIENT)
Dept: UROLOGY | Facility: CLINIC | Age: 54
End: 2024-06-14
Payer: COMMERCIAL

## 2024-06-14 ENCOUNTER — TELEPHONE (OUTPATIENT)
Dept: UROLOGY | Facility: CLINIC | Age: 54
End: 2024-06-14
Payer: COMMERCIAL

## 2024-06-14 VITALS
WEIGHT: 217.38 LBS | SYSTOLIC BLOOD PRESSURE: 133 MMHG | BODY MASS INDEX: 25.67 KG/M2 | HEIGHT: 77 IN | TEMPERATURE: 98 F | HEART RATE: 69 BPM | RESPIRATION RATE: 18 BRPM | DIASTOLIC BLOOD PRESSURE: 80 MMHG

## 2024-06-14 DIAGNOSIS — R97.20 ELEVATED PSA: ICD-10-CM

## 2024-06-14 RX ORDER — CEFTRIAXONE 1 G/1
1 INJECTION, POWDER, FOR SOLUTION INTRAMUSCULAR; INTRAVENOUS
Status: COMPLETED | OUTPATIENT
Start: 2024-06-14 | End: 2024-06-14

## 2024-06-14 RX ORDER — LIDOCAINE HYDROCHLORIDE 10 MG/ML
20 INJECTION INFILTRATION; PERINEURAL
Status: COMPLETED | OUTPATIENT
Start: 2024-06-14 | End: 2024-06-14

## 2024-06-14 RX ORDER — LIDOCAINE HYDROCHLORIDE 20 MG/ML
JELLY TOPICAL
Status: COMPLETED | OUTPATIENT
Start: 2024-06-14 | End: 2024-06-14

## 2024-06-14 RX ADMIN — CEFTRIAXONE 1 G: 1 INJECTION, POWDER, FOR SOLUTION INTRAMUSCULAR; INTRAVENOUS at 01:06

## 2024-06-14 RX ADMIN — LIDOCAINE HYDROCHLORIDE 20 ML: 10 INJECTION INFILTRATION; PERINEURAL at 01:06

## 2024-06-14 RX ADMIN — LIDOCAINE HYDROCHLORIDE: 20 JELLY TOPICAL at 01:06

## 2024-06-14 NOTE — Clinical Note
Angela, his current appt is 1 month.  Please see if we can do a virtual visit sooner to discuss path.  Thanks!

## 2024-06-14 NOTE — TELEPHONE ENCOUNTER
Appt r/s.  ----- Message from Jason Jung MD sent at 6/14/2024  1:54 PM CDT -----  Angela, his current appt is 1 month.  Please see if we can do a virtual visit sooner to discuss path.  Thanks!

## 2024-06-14 NOTE — PROCEDURES
"Transrectal Ultrasound w/ Biopsy    Date/Time: 6/14/2024 1:52 PM    Performed by: Jason Jung MD  Authorized by: Jason Jung MD    Consent Done?:  Yes (Written)  Time out: Immediately prior to procedure a "time out" was called to verify the correct patient, procedure, equipment, support staff and site/side marked as required.    Preparation: Patient was prepped and draped in usual sterile fashion    Anesthesia:  Lidocaine jelly  Patient sedated: No    Lesions:: No    Left Base Biopsies: 2  Left Mid Biopsies: 2  Left [place] Biopsies: 2  Right Base Biopsies: 2  Right Mid Biopsies: 2  Right [place] Biopsies: 2  Total Biopsies:  12    Patient tolerance:  Patient tolerated the procedure well with no immediate complications     MR and US imaged segmented and co-registered with uronav    Subtle hypoechoic area in left mid PZ anterior horn corresponding to MRI lesion; the left mid cores also sampled this area    4 additional cores taken from target for total of 16 cores    Urine cs neg  cipro and enemas and Rocephin done      Standard instructions given  RTC 2 weeks to discuss pathology results          "

## 2024-06-14 NOTE — PATIENT INSTRUCTIONS
What to Expect After a Prostate Biopsy    You may have mild bleeding from the rectum or urine for about 1 week to 1 month, or in your ejaculate for several months. This bleeding is normal and expected, and it will stop. You may have mild discomfort in your rectal or urethral area for 24-48 hours.    You cannot do any strenuous lifting, straining, or exercising for 24 hours. You may return to full activity the day after the biopsy.    You may continue to take all your regular medications after the procedure except for the blood thinners.    You may resume all blood-thinning medications once you no longer see any bleeding or whenever your physician prescribing the medication says it is all right to do so. You may take Tylenol if you have a fever and your temperature is less than 100° F or if you have some discomfort.    You will receive a call from the Urology Department at [facility] with the results of your prostate biopsy within one week.    Signs and Symptoms to Report    Call your [facility] urologist at [phone] if you develop any of the following:  Temperature greater than 101°  F  Inability to urinate  A large amount of bleeding from the rectum or in the urine  Persistent or severe pain    After hours or on weekends, you may reach a urology resident on call at this number: [phone].

## 2024-06-24 ENCOUNTER — TELEPHONE (OUTPATIENT)
Dept: GASTROENTEROLOGY | Facility: CLINIC | Age: 54
End: 2024-06-24
Payer: COMMERCIAL

## 2024-06-24 NOTE — TELEPHONE ENCOUNTER
Vmail left requesting call back to reschedule cscope on August 8 as Dr. Adorno will be out. Phone number provided for call back. MessageMet msg sent.

## 2024-06-26 NOTE — PROGRESS NOTES
Subjective:      Cornell Gordon is a 53 y.o. male who returns today regarding his uronav biopsy results.    He still has some blood when straining.        The following portions of the patient's history were reviewed and updated as appropriate: allergies, current medications, past family history, past medical history, past social history, past surgical history and problem list.    Review of Systems  A comprehensive multipoint review of systems was negative except as otherwise stated in the HPI.    No past medical history on file.  Past Surgical History:   Procedure Laterality Date    COLONOSCOPY N/A 6/7/2024    Procedure: COLONOSCOPY;  Surgeon: Sam Adorno MD;  Location: [facility];  Service: Endoscopy;  Laterality: N/A;       Review of patient's allergies indicates:   Allergen Reactions    Motrin [ibuprofen] Hives          Objective:   Vitals: There were no vitals taken for this visit.    Physical Exam   General: alert and oriented, no acute distress  Respiratory: Symmetric expansion, non-labored breathing  Cardiovascular: no peripheral edema  Abdomen: soft, non distended  Skin: normal coloration and turgor, no rashes, no suspicious skin lesions noted  Neuro: no gross deficits  Psych: normal judgment and insight, normal mood/affect, and non-anxious    Physical Exam    Lab Review   Urinalysis demonstrates   Trace wbc  +rbc  Otherwise neg    Lab Results   Component Value Date    WBC 5.09 02/02/2024    HGB 14.0 02/02/2024    HCT 44.0 02/02/2024    MCV 85 02/02/2024     02/02/2024     Lab Results   Component Value Date    CREATININE 1.0 04/05/2024    BUN 14 04/05/2024     Lab Results   Component Value Date    PSA 4.7 (H) 02/02/2024    PSA 3.7 10/14/2022    PSA 2.5 08/13/2020    PSATOTAL 6.3 (H) 04/05/2024 PSAD 0.11                 Final Pathologic Diagnosis 1. Prostate, left apex, biopsies:  - Benign prostate tissue    2. Prostate, left middle, biopsies:  - Benign prostate tissue    3. Prostate, left  base, biopsies:  - Benign prostate tissue    4. Prostate, right apex, biopsies:  - Benign prostate tissue    5. Prostate, right middle, biopsies:  - Benign prostate tissue    6. Prostate, right base, biopsies:  - Benign prostate tissue    7. Prostate, target lesion, biopsies:  - Prostatic adenocarcinoma, Mary Score 3+3=6, in 1 of 4 cores (2% of the entirely submitted tissue)   Comment: Interp By Isael Hahn MD, PhD, Signed on 06/25/2024 at 12:46         Imaging  MRI PROSTATE W W/O CONTRAST     CLINICAL HISTORY:  Prostate cancer suspected;psa 4.7;  Elevated prostate specific antigen (PSA)     Additional history: None provided.     TECHNIQUE:  Multiparametric multiplanar, multisequence images including high resolution, small field-of-view T2-WI; axial diffusion weighted images with multiple B-values and creation of ADC-maps; and dynamic contrast enhanced T1-weighted images through the prostate were obtained before, during, and after the administration of 10 cc intravenous gadolinium.     COMPARISON:  None.     FINDINGS:  Previous biopsy: None.     PSA: 6.3 ng/mL 04/05/2024     Prior therapy: None     Prostate: 5.4 x 4.4 x 4.9 cm corresponding to a computed volume of 57.8 cc.     Peripheral zone: Few linear areas of T2 hypointense signal bilaterally, score 2     Lesion (NARA) #P-1     Location: Side: Left; Region: Mid to apex; Zone: posterior peripheral zone laterally     Greatest dimension: 1.2 cm     T2-WI: Heterogeneous signal intensity or non-circumscribed, rounded, moderate hypointensity, score 3.     DWI/ADC: Focal (discrete and different from the background) hypointense on ADC and/or focal hyperintense on high b-value DWI; may be markedly hypointense on ADC or markedly hyperintense on high b-value DWI, but not both, score 3.     DCE: Negative     Extraprostatic extension: Negative     PI-RADS assessment category: 3     Transitional zone: Benign prostatic hyperplasia without focal suspicious abnormality,  score 2.     Neurovascular bundle: Normal appearance.     Seminal vesicles: Normal appearance.     Adjacent Organ Involvement: No evidence for urinary bladder or rectal invasion.     Lymphadenopathy: None.     Other Findings: None.     Impression:     1. Left peripheral zone lesion with intermediate risk of clinically significant prostate cancer.  2. Benign prostatic hyperplasia.  Overall Assessment: PI-RADS 3 - Intermediate (the presence of clinically significant cancer is equivocal)     Number of targets created for potential MR/US fusion biopsy     Peripheral zone: 1     Transition zone: 0        Electronically signed by:Gavin Christianson MD  Date:                                            05/04/2024  Time:                                           10:33  Assessment and Plan:   Prostate cancer  [place] 6 3+3 GG1 cT2a cN0 cMx; psa psa 6.3      We discussed his grade and stage of disease, life expectancy and active surveillance vs treatment.  We discussed the roles of surgery, radiation (external and brachytherapy), HIFU and Cryosurgery, hormonal therapy and chemotherapy in the treatment of prostate cancer.  We discussed brachytherapy and external radiation therapy and open and robotic surgery.  We discussed the risks and benefits of each. We discussed erectile dysfunction, urinary incontinence and bowel issues.  We discussed referral to radiation oncology to further discuss options.  I answered his questions.    He prefers active surveillance    Will plan MRI in 6 months then see Dr Jung.  We will schedule confirmatory biopsy after that appt    Family hist of prostate ca  Genetics referral for germline testing  There is very small volume of prostate ca on biopsy, so I doubt somatic testing will be possible with this sample              I spent 45 min on the day of this encounter preparing for, treating and managing the above

## 2024-07-01 ENCOUNTER — TELEPHONE (OUTPATIENT)
Dept: HEMATOLOGY/ONCOLOGY | Facility: CLINIC | Age: 54
End: 2024-07-01
Payer: COMMERCIAL

## 2024-07-01 ENCOUNTER — PATIENT MESSAGE (OUTPATIENT)
Dept: HEMATOLOGY/ONCOLOGY | Facility: CLINIC | Age: 54
End: 2024-07-01
Payer: COMMERCIAL

## 2024-07-01 ENCOUNTER — OFFICE VISIT (OUTPATIENT)
Dept: UROLOGY | Facility: CLINIC | Age: 54
End: 2024-07-01
Payer: COMMERCIAL

## 2024-07-01 ENCOUNTER — TELEPHONE (OUTPATIENT)
Dept: UROLOGY | Facility: CLINIC | Age: 54
End: 2024-07-01

## 2024-07-01 VITALS
RESPIRATION RATE: 12 BRPM | SYSTOLIC BLOOD PRESSURE: 121 MMHG | BODY MASS INDEX: 25.67 KG/M2 | HEIGHT: 77 IN | WEIGHT: 217.38 LBS | HEART RATE: 62 BPM | DIASTOLIC BLOOD PRESSURE: 69 MMHG | OXYGEN SATURATION: 100 %

## 2024-07-01 DIAGNOSIS — C61 PROSTATE CANCER: Primary | ICD-10-CM

## 2024-07-01 PROCEDURE — 3044F HG A1C LEVEL LT 7.0%: CPT | Mod: CPTII,S$GLB,, | Performed by: UROLOGY

## 2024-07-01 PROCEDURE — 3078F DIAST BP <80 MM HG: CPT | Mod: CPTII,S$GLB,, | Performed by: UROLOGY

## 2024-07-01 PROCEDURE — 1159F MED LIST DOCD IN RCRD: CPT | Mod: CPTII,S$GLB,, | Performed by: UROLOGY

## 2024-07-01 PROCEDURE — 3008F BODY MASS INDEX DOCD: CPT | Mod: CPTII,S$GLB,, | Performed by: UROLOGY

## 2024-07-01 PROCEDURE — 99215 OFFICE O/P EST HI 40 MIN: CPT | Mod: S$GLB,,, | Performed by: UROLOGY

## 2024-07-01 PROCEDURE — 3074F SYST BP LT 130 MM HG: CPT | Mod: CPTII,S$GLB,, | Performed by: UROLOGY

## 2024-07-01 NOTE — Clinical Note
See Dr Jung 6 months with psa, BMP, MRI prostate please  See genetics for personal and family hist of prostate ca please

## 2024-07-01 NOTE — TELEPHONE ENCOUNTER
-Called patient and left message to call back to set up a genetic appointment and sent message in the portal. ---- Message from Jason Jung MD sent at 7/1/2024  2:55 PM CDT -----  See Dr Jung 6 months with psa, BMP, MRI prostate please    See genetics for personal and family hist of prostate ca please

## 2024-07-09 ENCOUNTER — PATIENT MESSAGE (OUTPATIENT)
Dept: UROLOGY | Facility: CLINIC | Age: 54
End: 2024-07-09
Payer: COMMERCIAL

## 2024-08-14 ENCOUNTER — TELEPHONE (OUTPATIENT)
Dept: GASTROENTEROLOGY | Facility: CLINIC | Age: 54
End: 2024-08-14
Payer: COMMERCIAL

## 2024-10-07 ENCOUNTER — PATIENT MESSAGE (OUTPATIENT)
Dept: SURGERY | Facility: CLINIC | Age: 54
End: 2024-10-07
Payer: COMMERCIAL

## 2024-10-11 ENCOUNTER — ANESTHESIA (OUTPATIENT)
Dept: ENDOSCOPY | Facility: HOSPITAL | Age: 54
End: 2024-10-11
Payer: COMMERCIAL

## 2024-10-11 ENCOUNTER — HOSPITAL ENCOUNTER (OUTPATIENT)
Facility: HOSPITAL | Age: 54
Discharge: HOME | End: 2024-10-11
Attending: STUDENT IN AN ORGANIZED HEALTH CARE EDUCATION/TRAINING PROGRAM | Admitting: STUDENT IN AN ORGANIZED HEALTH CARE EDUCATION/TRAINING PROGRAM
Payer: COMMERCIAL

## 2024-10-11 ENCOUNTER — ANESTHESIA EVENT (OUTPATIENT)
Dept: ENDOSCOPY | Facility: HOSPITAL | Age: 54
End: 2024-10-11
Payer: COMMERCIAL

## 2024-10-11 VITALS
BODY MASS INDEX: 24.79 KG/M2 | WEIGHT: 210 LBS | DIASTOLIC BLOOD PRESSURE: 67 MMHG | TEMPERATURE: 98 F | RESPIRATION RATE: 16 BRPM | HEIGHT: 77 IN | HEART RATE: 64 BPM | SYSTOLIC BLOOD PRESSURE: 127 MMHG | OXYGEN SATURATION: 98 %

## 2024-10-11 DIAGNOSIS — Z12.11 SCREENING FOR COLON CANCER: Primary | ICD-10-CM

## 2024-10-11 PROCEDURE — 25000003 PHARM REV CODE 250: Mod: PO | Performed by: STUDENT IN AN ORGANIZED HEALTH CARE EDUCATION/TRAINING PROGRAM

## 2024-10-11 PROCEDURE — 27201012 HC FORCEPS, HOT/COLD, DISP: Mod: PO | Performed by: STUDENT IN AN ORGANIZED HEALTH CARE EDUCATION/TRAINING PROGRAM

## 2024-10-11 PROCEDURE — 45380 COLONOSCOPY AND BIOPSY: CPT | Mod: 22,33,, | Performed by: STUDENT IN AN ORGANIZED HEALTH CARE EDUCATION/TRAINING PROGRAM

## 2024-10-11 PROCEDURE — 37000008 HC ANESTHESIA 1ST 15 MINUTES: Mod: PO | Performed by: STUDENT IN AN ORGANIZED HEALTH CARE EDUCATION/TRAINING PROGRAM

## 2024-10-11 PROCEDURE — 88305 TISSUE EXAM BY PATHOLOGIST: CPT | Mod: PO | Performed by: PATHOLOGY

## 2024-10-11 PROCEDURE — 37000009 HC ANESTHESIA EA ADD 15 MINS: Mod: PO | Performed by: STUDENT IN AN ORGANIZED HEALTH CARE EDUCATION/TRAINING PROGRAM

## 2024-10-11 PROCEDURE — A4216 STERILE WATER/SALINE, 10 ML: HCPCS | Mod: PO | Performed by: STUDENT IN AN ORGANIZED HEALTH CARE EDUCATION/TRAINING PROGRAM

## 2024-10-11 PROCEDURE — 45380 COLONOSCOPY AND BIOPSY: CPT | Mod: 33,PO | Performed by: STUDENT IN AN ORGANIZED HEALTH CARE EDUCATION/TRAINING PROGRAM

## 2024-10-11 PROCEDURE — 88305 TISSUE EXAM BY PATHOLOGIST: CPT | Mod: 26,,, | Performed by: PATHOLOGY

## 2024-10-11 PROCEDURE — 63600175 PHARM REV CODE 636 W HCPCS: Mod: PO | Performed by: NURSE ANESTHETIST, CERTIFIED REGISTERED

## 2024-10-11 RX ORDER — SODIUM CHLORIDE 0.9 % (FLUSH) 0.9 %
10 SYRINGE (ML) INJECTION
Status: DISCONTINUED | OUTPATIENT
Start: 2024-10-11 | End: 2024-10-11 | Stop reason: HOSPADM

## 2024-10-11 RX ORDER — SODIUM CHLORIDE, SODIUM LACTATE, POTASSIUM CHLORIDE, CALCIUM CHLORIDE 600; 310; 30; 20 MG/100ML; MG/100ML; MG/100ML; MG/100ML
INJECTION, SOLUTION INTRAVENOUS CONTINUOUS
Status: DISCONTINUED | OUTPATIENT
Start: 2024-10-11 | End: 2024-10-11 | Stop reason: HOSPADM

## 2024-10-11 RX ORDER — PROPOFOL 10 MG/ML
VIAL (ML) INTRAVENOUS
Status: DISCONTINUED | OUTPATIENT
Start: 2024-10-11 | End: 2024-10-11

## 2024-10-11 RX ORDER — LIDOCAINE HYDROCHLORIDE 20 MG/ML
INJECTION INTRAVENOUS
Status: DISCONTINUED | OUTPATIENT
Start: 2024-10-11 | End: 2024-10-11

## 2024-10-11 RX ADMIN — PROPOFOL 40 MG: 10 INJECTION, EMULSION INTRAVENOUS at 10:10

## 2024-10-11 RX ADMIN — PROPOFOL 40 MG: 10 INJECTION, EMULSION INTRAVENOUS at 11:10

## 2024-10-11 RX ADMIN — LIDOCAINE HYDROCHLORIDE 100 MG: 20 INJECTION INTRAVENOUS at 10:10

## 2024-10-11 RX ADMIN — PROPOFOL 30 MG: 10 INJECTION, EMULSION INTRAVENOUS at 10:10

## 2024-10-11 RX ADMIN — SODIUM CHLORIDE, PRESERVATIVE FREE 60 ML: 5 INJECTION INTRAVENOUS at 11:10

## 2024-10-11 RX ADMIN — PROPOFOL 110 MG: 10 INJECTION, EMULSION INTRAVENOUS at 10:10

## 2024-10-11 RX ADMIN — PROPOFOL 70 MG: 10 INJECTION, EMULSION INTRAVENOUS at 10:10

## 2024-10-11 NOTE — ANESTHESIA PREPROCEDURE EVALUATION
10/11/2024  Cornell Gordon is a 53 y.o., male.      Pre-op Assessment    I have reviewed the Patient Summary Reports.     I have reviewed the Nursing Notes. I have reviewed the NPO Status.   I have reviewed the Medications.     Review of Systems  Anesthesia Hx:  No problems with previous Anesthesia                Social:  Non-Smoker       Cardiovascular:  Cardiovascular Normal                                            Pulmonary:  Pulmonary Normal                       Renal/:  Renal/ Normal    Prostate CA               Neurological:  Neurology Normal                                      Endocrine:  Endocrine Normal                Physical Exam  General: Well nourished, Cooperative, Alert and Oriented    Airway:  Mallampati: II   Mouth Opening: Normal  TM Distance: Normal  Neck ROM: Normal ROM    Anesthesia Plan  Type of Anesthesia, risks & benefits discussed:    Anesthesia Type: Gen ETT, Gen Supraglottic Airway, Gen Natural Airway, MAC  Intra-op Monitoring Plan: Standard ASA Monitors  Post Op Pain Control Plan: multimodal analgesia  Induction:  IV  Airway Plan: Direct, Video and Fiberoptic, Post-Induction  Informed Consent: Informed consent signed with the Patient and all parties understand the risks and agree with anesthesia plan.  All questions answered.   ASA Score: 2    Ready For Surgery From Anesthesia Perspective.   .

## 2024-10-11 NOTE — PROVATION PATIENT INSTRUCTIONS
Discharge Summary/Instructions after an Endoscopic Procedure  Patient Name: Cornell Gordon  Patient MRN: [id number]  Patient YOB: 1970 Friday, October 11, 2024  Koko Pérez DO  Dear patient,  As a result of recent federal legislation (The Federal Cures Act), you may   receive lab or pathology results from your procedure in your [facility]   account before your physician is able to contact you. Your physician or   their representative will relay the results to you with their   recommendations at their soonest availability.  Thank you,  RESTRICTIONS:  During your procedure today, you received medications for sedation.  These   medications may affect your judgment, balance and coordination.  Therefore,   for 24 hours, you have the following restrictions:   - DO NOT drive a car, operate machinery, make legal/financial decisions,   sign important papers or drink alcohol.    ACTIVITY:  Today: no heavy lifting, straining or running due to procedural   sedation/anesthesia.  The following day: return to full activity including work.  DIET:  Eat and drink normally unless instructed otherwise.     TREATMENT FOR COMMON SIDE EFFECTS:  - Mild abdominal pain, nausea, belching, bloating or excessive gas:  rest,   eat lightly and use a heating pad.  - Sore Throat: treat with throat lozenges and/or gargle with warm salt   water.  - Because air was used during the procedure, expelling large amounts of air   from your rectum or belching is normal.  - If a bowel prep was taken, you may not have a bowel movement for 1-3 days.    This is normal.  SYMPTOMS TO WATCH FOR AND REPORT TO YOUR PHYSICIAN:  1. Abdominal pain or bloating, other than gas cramps.  2. Chest pain.  3. Back pain.  4. Signs of infection such as: chills or fever occurring within 24 hours   after the procedure.  5. Rectal bleeding, which would show as bright red, maroon, or black stools.   (A tablespoon of blood from the rectum is not serious, especially  if   hemorrhoids are present.)  6. Vomiting.  7. Weakness or dizziness.  GO DIRECTLY TO THE NEAREST EMERGENCY ROOM IF YOU HAVE ANY OF THE FOLLOWING:      Difficulty breathing              Chills and/or fever over 101 F   Persistent vomiting and/or vomiting blood   Severe abdominal pain   Severe chest pain   Black, tarry stools   Bleeding- more than one tablespoon   Any other symptom or condition that you feel may need urgent attention  Your doctor recommends these additional instructions:  If any biopsies were taken, your doctors clinic will contact you in 1 to 2   weeks with any results.  You have a contact number available for emergencies.  The signs and symptoms   of potential delayed complications were discussed with you.  You may return   to normal activities tomorrow.  Written discharge instructions were   provided to you.   Eat a high fiber diet.   Continue your present medications.   We are waiting for your pathology results.   Your physician has recommended a repeat colonoscopy in five years for   surveillance based on pathology results.   Return to your GI office as needed.   You are being discharged to home.  For questions, problems or results please call your physician - Koko Pérez DO at Work:  [phone].  EMERGENCY PHONE NUMBER: [phone], LAB RESULTS: [phone]  IF A COMPLICATION OR EMERGENCY SITUATION ARISES AND YOU ARE UNABLE TO REACH   YOUR PHYSICIAN - GO DIRECTLY TO THE EMERGENCY ROOM.  ___________________________________________  Nurse Signature  ___________________________________________  Patient/Designated Responsible Party Signature  Koko Pérez DO  10/11/2024 11:22:31 AM  This report has been verified and signed electronically.  Dear patient,  As a result of recent federal legislation (The Federal Cures Act), you may   receive lab or pathology results from your procedure in your [facility]   account before your physician is able to contact you. Your physician or    their representative will relay the results to you with their   recommendations at their soonest availability.  Thank you.  PROVATION

## 2024-10-11 NOTE — TRANSFER OF CARE
"Anesthesia Transfer of Care Note    Patient: Cornell Gordon    Procedure(s) Performed: Procedure(s) (LRB):  COLONOSCOPY (N/A)    Patient location: PACU    Anesthesia Type: general    Transport from OR: Transported from OR on room air with adequate spontaneous ventilation    Post pain: adequate analgesia    Post assessment: no apparent anesthetic complications    Post vital signs: stable    Level of consciousness: sedated    Nausea/Vomiting: no nausea/vomiting    Complications: none    Transfer of care protocol was followed    Last vitals: Visit Vitals  /75 (BP Location: Left arm, Patient Position: Sitting)   Pulse 61   Temp 36.7 °C (98.1 °F) (Temporal)   Resp 18   Ht 6' 5" (1.956 m)   Wt 95.3 kg (210 lb)   SpO2 98%   BMI 24.90 kg/m²     "

## 2024-10-11 NOTE — ANESTHESIA POSTPROCEDURE EVALUATION
Anesthesia Post Evaluation    Patient: Cornell Gordon    Procedure(s) Performed: Procedure(s) (LRB):  COLONOSCOPY (N/A)    Final Anesthesia Type: general      Patient location during evaluation: PACU  Patient participation: Yes- Able to Participate  Level of consciousness: awake and alert and oriented  Post-procedure vital signs: reviewed and stable  Pain management: adequate  Airway patency: patent    PONV status at discharge: No PONV  Anesthetic complications: no      Cardiovascular status: blood pressure returned to baseline and stable  Respiratory status: unassisted and spontaneous ventilation  Hydration status: euvolemic  Follow-up not needed.              Vitals Value Taken Time   /67 10/11/24 1205   Temp 36.7 °C (98 °F) 10/11/24 1120   Pulse 64 10/11/24 1205   Resp 16 10/11/24 1205   SpO2 98 % 10/11/24 1205         Event Time   Out of Recovery 12:15:00         Pain/Jackie Score: Jackie Score: 10 (10/11/2024 11:43 AM)

## 2024-10-11 NOTE — H&P
History & Physical - Short Stay  Gastroenterology      SUBJECTIVE:     Procedure: Colonoscopy    Chief Complaint/Indication for Procedure: Screening    PTA Medications   Medication Sig    ergocalciferol, vitamin D2, (VITAMIN D ORAL) Take by mouth once daily.    LYSINE ORAL Take 1 Dose by mouth once daily.    multivitamin capsule Take 1 capsule by mouth once daily.    tamsulosin (FLOMAX) 0.4 mg Cap Take 1 capsule (0.4 mg total) by mouth once daily. (Patient not taking: Reported on 6/3/2024)       Review of patient's allergies indicates:   Allergen Reactions    Motrin [ibuprofen] Hives        Past Medical History:   Diagnosis Date    Prostate Cancer      Past Surgical History:   Procedure Laterality Date    COLONOSCOPY N/A 6/7/2024    Procedure: COLONOSCOPY;  Surgeon: Sam Adorno MD;  Location: [facility];  Service: Endoscopy;  Laterality: N/A;     Family History   Problem Relation Name Age of Onset    Breast cancer Mother Maddi     Lung cancer Mother Maddi     Cancer Mother Maddi         Breast cancer,  lung cancer    COPD Mother Maddi     Prostate cancer Father Eamon     Cancer Father Eamon         Prostate Cancer    Stroke Father Eamon         Has vascular dementia caused by mini strokes.    Cancer Maternal Aunt Valerie         Pancreatic cancer    Cancer Paternal Uncle Sharan         throat    Prostate cancer Maternal Grandfather Osmel     Cancer Maternal Grandfather Osmel         Prostate cancer    Melanoma Neg Hx      Psoriasis Neg Hx      Lupus Neg Hx      Eczema Neg Hx       Social History     Tobacco Use    Smoking status: Never    Smokeless tobacco: Never   Substance Use Topics    Alcohol use: Not Currently     Alcohol/week: 1.0 standard drink of alcohol     Types: 1 Cans of beer per week     Comment: pt states occasionally one one drink per week     Drug use: Never         OBJECTIVE:     Vital Signs (Most Recent)  Temp: 98.1 °F (36.7 °C) (10/11/24 0950)  Pulse: 61 (10/11/24 0950)  Resp: 18 (10/11/24  0950)  BP: 137/75 (10/11/24 0950)  SpO2: 98 % (10/11/24 0950)    Physical Exam:                                                       GENERAL:  Comfortable, in no acute distress.                                 HEENT EXAM:  Nonicteric.  No adenopathy.  Oropharynx is clear.               NECK:  Supple.                                                               LUNGS:  Clear.                                                               CARDIAC:  Regular rate and rhythm.  S1, S2.  No murmur.                      ABDOMEN:  Soft, positive bowel sounds, nontender.  No hepatosplenomegaly or masses.  No rebound or guarding.                                             EXTREMITIES:  No edema.     MENTAL STATUS:  Normal, alert and oriented.      ASSESSMENT/PLAN:     Assessment: Screening    Plan: Colonoscopy    Anesthesia Plan: General    ASA Grade: ASA 2 - Patient with mild systemic disease with no functional limitations    MALLAMPATI SCORE:  I (soft palate, uvula, fauces, and tonsillar pillars visible)

## 2024-10-14 LAB
FINAL PATHOLOGIC DIAGNOSIS: NORMAL
GROSS: NORMAL
Lab: NORMAL

## 2024-10-14 NOTE — PROGRESS NOTES
Please advise patient that polyp pathology was reviewed. Both polyps are benign. One of the polyps is the adenomatous type which is precancerous/risk factor for cancer. The other polyp is the hyperplastic type which is not precancerous. Repeat colonoscopy recommended in 5 years.  Place reminder in system for repeat colonoscopy.

## 2024-11-22 ENCOUNTER — HOSPITAL ENCOUNTER (OUTPATIENT)
Dept: RADIOLOGY | Facility: HOSPITAL | Age: 54
Discharge: HOME | End: 2024-11-22
Attending: UROLOGY
Payer: COMMERCIAL

## 2024-11-22 DIAGNOSIS — C61 PROSTATE CANCER: ICD-10-CM

## 2024-11-22 PROCEDURE — 72197 MRI PELVIS W/O & W/DYE: CPT | Mod: 26,,, | Performed by: INTERNAL MEDICINE

## 2024-11-22 PROCEDURE — 72197 MRI PELVIS W/O & W/DYE: CPT | Mod: TC

## 2024-11-22 RX ORDER — GADOBUTROL 604.72 MG/ML
10 INJECTION INTRAVENOUS
Status: DISCONTINUED | OUTPATIENT
Start: 2024-11-22 | End: 2024-11-23 | Stop reason: HOSPADM

## 2024-11-27 ENCOUNTER — PATIENT MESSAGE (OUTPATIENT)
Dept: UROLOGY | Facility: CLINIC | Age: 54
End: 2024-11-27
Payer: COMMERCIAL

## 2024-12-02 NOTE — PROGRESS NOTES
Subjective:      Cornell Gordon is a 53 y.o. male who returns today regarding his uronav biopsy results.                 The following portions of the patient's history were reviewed and updated as appropriate: allergies, current medications, past family history, past medical history, past social history, past surgical history and problem list.     Review of Systems  A comprehensive multipoint review of systems was negative except as otherwise stated in the HPI.     No past medical history on file.        Past Surgical History:   Procedure Laterality Date    COLONOSCOPY N/A 6/7/2024     Procedure: COLONOSCOPY;  Surgeon: Sam Adorno MD;  Location: [facility];  Service: Endoscopy;  Laterality: N/A;              Review of patient's allergies indicates:   Allergen Reactions    Motrin [ibuprofen] Hives            Objective:   Vitals: There were no vitals taken for this visit.     Physical Exam   General: alert and oriented, no acute distress  Respiratory: Symmetric expansion, non-labored breathing  Cardiovascular: no peripheral edema  Abdomen: soft, non distended  Skin: normal coloration and turgor, no rashes, no suspicious skin lesions noted  Neuro: no gross deficits  Psych: normal judgment and insight, normal mood/affect, and non-anxious     Physical Exam     Lab Review   Urinalysis demonstrates   Trace wbc  +rbc  Otherwise neg           Lab Results   Component Value Date     WBC 5.09 02/02/2024     HGB 14.0 02/02/2024     HCT 44.0 02/02/2024     MCV 85 02/02/2024      02/02/2024            Lab Results   Component Value Date     CREATININE 1.0 04/05/2024     BUN 14 04/05/2024            Lab Results   Component Value Date     PSA 4.7 (H) 02/02/2024     PSA 3.7 10/14/2022     PSA 2.5 08/13/2020     PSATOTAL 6.3 (H) 04/05/2024 PSAD 0.11                          Final Pathologic Diagnosis 1. Prostate, left apex, biopsies:  - Benign prostate tissue    2. Prostate, left middle, biopsies:  - Benign prostate  tissue    3. Prostate, left base, biopsies:  - Benign prostate tissue    4. Prostate, right apex, biopsies:  - Benign prostate tissue    5. Prostate, right middle, biopsies:  - Benign prostate tissue    6. Prostate, right base, biopsies:  - Benign prostate tissue    7. Prostate, target lesion, biopsies:  - Prostatic adenocarcinoma, Mary Score 3+3=6, in 1 of 4 cores (2% of the entirely submitted tissue)   Comment: Interp By Isael Hahn MD, PhD, Signed on 06/25/2024 at 12:46            Imaging  MRI PROSTATE W W/O CONTRAST     CLINICAL HISTORY:  Prostate cancer suspected;psa 4.7;  Elevated prostate specific antigen (PSA)     Additional history: None provided.     TECHNIQUE:  Multiparametric multiplanar, multisequence images including high resolution, small field-of-view T2-WI; axial diffusion weighted images with multiple B-values and creation of ADC-maps; and dynamic contrast enhanced T1-weighted images through the prostate were obtained before, during, and after the administration of 10 cc intravenous gadolinium.     COMPARISON:  None.     FINDINGS:  Previous biopsy: None.     PSA: 6.3 ng/mL 04/05/2024     Prior therapy: None     Prostate: 5.4 x 4.4 x 4.9 cm corresponding to a computed volume of 57.8 cc.     Peripheral zone: Few linear areas of T2 hypointense signal bilaterally, score 2     Lesion ([place]) #P-1     Location: Side: Left; Region: Mid to apex; Zone: posterior peripheral zone laterally     Greatest dimension: 1.2 cm     T2-WI: Heterogeneous signal intensity or non-circumscribed, rounded, moderate hypointensity, score 3.     DWI/ADC: Focal (discrete and different from the background) hypointense on ADC and/or focal hyperintense on high b-value DWI; may be markedly hypointense on ADC or markedly hyperintense on high b-value DWI, but not both, score 3.     DCE: Negative     Extraprostatic extension: Negative     PI-RADS assessment category: 3     Transitional zone: Benign prostatic hyperplasia without  focal suspicious abnormality, score 2.     Neurovascular bundle: Normal appearance.     Seminal vesicles: Normal appearance.     Adjacent Organ Involvement: No evidence for urinary bladder or rectal invasion.     Lymphadenopathy: None.     Other Findings: None.     Impression:     1. Left peripheral zone lesion with intermediate risk of clinically significant prostate cancer.  2. Benign prostatic hyperplasia.  Overall Assessment: PI-RADS 3 - Intermediate (the presence of clinically significant cancer is equivocal)     Number of targets created for potential MR/US fusion biopsy     Peripheral zone: 1     Transition zone: 0        Electronically signed by:Gavin Christianson MD  Date:                                            05/04/2024  Time:                                           10:33      MRI PROSTATE W W/O CONTRAST     CLINICAL HISTORY:  left mid/apex [place] 6 3+3 GG1 cT2a cN0 cMx; psa psa 6.3;  Malignant neoplasm of prostate     TECHNIQUE:  MRI prostate in accordance with PI-RADS recommendations before and after intravenous administration of 10 mL Gadavist.     COMPARISON:  Prostate MRI 05/03/2024     FINDINGS:  PSA: 6.3 ng/mL on 04/05/2024.     Previous biopsy: 06/14/2024, adenocarcinoma in the left peripheral zone target lesion, Mary 6.     Prior therapy: None.     Prostate: 5.6 x 5.1 x 4.2 cm corresponding to a computed volume of 62 cc.  There are regions of T1 hyperintense signal compatible with post biopsy hemorrhage.     Peripheral zone: Linear/wedge-shaped regions of mild T2 hypointense, mild DWI hyperintense, and/or mild ADC hypointense signal in the peripheral zone bilaterally.  Suspicious lesion as follows:     Lesion #P-1     Location: Left, posterior lateral, mid gland-apex     Size: 1.2 cm (06:14), unchanged from 05/03/2024     T2W: Heterogeneous signal intensity or non-circumscribed, rounded, moderate hypointensity. Includes others that do not qualify as 2, 4, or 5. Score 3.     DWI: Focal  (discrete and different from the background) hypointense on ADC and/or focal hyperintense on high b-value DWI; may be markedly hypointense on ADC or markedly hyperintense on high b-value DWI, but not both. Score 3.     DCE: Negative.     Prostate margin: Tumor-capsule interface >1.0 cm, but no breach of the capsule.     PI-RADS category: 3     Transitional zone: Benign prostatic hyperplasia. No focal lesion concerning for prostate cancer.     Neurovascular bundle: Normal appearance.     Seminal vesicles: Normal appearance.     Other pelvic organs: Bladder and rectum are unremarkable.     Lymph nodes: No lymphadenopathy.     Soft tissues/bones: No marrow replacing lesions.     Impression:     Unchanged 1.2 cm lesion in the left peripheral zone, previously biopsied as adenocarcinoma.  There is broad-based capsular abutment but no extraprostatic extension.  No regional lymphadenopathy.     No new suspicious lesions.     Number of targets created for potential MR/US fusion biopsy:     Peripheral zone: 0     Transition zone: 0        Electronically signed by:Cornell Antonio  Date:                                            11/22/2024  Time:                                           14:56  Assessment and Plan:   Prostate cancer  [place] 6 3+3 GG1 cT2a cN0 cMx; psa psa 6.3        We discussed his grade and stage of disease, life expectancy and active surveillance vs treatment.  We discussed the roles of surgery, radiation (external and brachytherapy), HIFU and Cryosurgery, hormonal therapy and chemotherapy in the treatment of prostate cancer.  We discussed brachytherapy and external radiation therapy and open and robotic surgery.  We discussed the risks and benefits of each. We discussed erectile dysfunction, urinary incontinence and bowel issues.  We discussed referral to radiation oncology to further discuss options.  I answered his questions.     He prefers active surveillance     Schedule confirmatory Uronav biopsy  12/13/2024  UA and reflex CS  Cipro 500mg po *1 on the morning of the biopsy  Uronav Prostate Biopsy at [facility], [place]  Please review Prostate Biopsy instruction sheet with patient  See Dr Jung 1 weeks after biopsy to discuss path results      Enlarged prostate 62cc       Family hist of prostate ca  Genetics referral for germline testing  There is very small volume of prostate ca on biopsy, so I doubt somatic testing will be possible with this sample                  I spent 45 min on the day of this encounter preparing for, treating and managing the above

## 2024-12-03 ENCOUNTER — OFFICE VISIT (OUTPATIENT)
Dept: UROLOGY | Facility: CLINIC | Age: 54
End: 2024-12-03
Payer: COMMERCIAL

## 2024-12-03 ENCOUNTER — TELEPHONE (OUTPATIENT)
Dept: UROLOGY | Facility: CLINIC | Age: 54
End: 2024-12-03
Payer: COMMERCIAL

## 2024-12-03 VITALS
BODY MASS INDEX: 26.1 KG/M2 | SYSTOLIC BLOOD PRESSURE: 106 MMHG | DIASTOLIC BLOOD PRESSURE: 66 MMHG | WEIGHT: 220.13 LBS | HEART RATE: 63 BPM

## 2024-12-03 DIAGNOSIS — C61 PROSTATE CANCER: Primary | ICD-10-CM

## 2024-12-03 LAB
BILIRUB UR QL STRIP: NEGATIVE
CLARITY UR REFRACT.AUTO: CLEAR
COLOR UR AUTO: COLORLESS
GLUCOSE UR QL STRIP: NEGATIVE
HGB UR QL STRIP: NEGATIVE
KETONES UR QL STRIP: NEGATIVE
LEUKOCYTE ESTERASE UR QL STRIP: NEGATIVE
NITRITE UR QL STRIP: NEGATIVE
PH UR STRIP: 5 [PH] (ref 5–8)
PROT UR QL STRIP: NEGATIVE
SP GR UR STRIP: 1 (ref 1–1.03)
URN SPEC COLLECT METH UR: ABNORMAL

## 2024-12-03 PROCEDURE — 99999 PR PBB SHADOW E&M-EST. PATIENT-LVL III: CPT | Mod: PBBFAC,,, | Performed by: UROLOGY

## 2024-12-03 PROCEDURE — 3008F BODY MASS INDEX DOCD: CPT | Mod: CPTII,S$GLB,, | Performed by: UROLOGY

## 2024-12-03 PROCEDURE — 81003 URINALYSIS AUTO W/O SCOPE: CPT | Performed by: UROLOGY

## 2024-12-03 PROCEDURE — 1159F MED LIST DOCD IN RCRD: CPT | Mod: CPTII,S$GLB,, | Performed by: UROLOGY

## 2024-12-03 PROCEDURE — 3044F HG A1C LEVEL LT 7.0%: CPT | Mod: CPTII,S$GLB,, | Performed by: UROLOGY

## 2024-12-03 PROCEDURE — 3078F DIAST BP <80 MM HG: CPT | Mod: CPTII,S$GLB,, | Performed by: UROLOGY

## 2024-12-03 PROCEDURE — 3074F SYST BP LT 130 MM HG: CPT | Mod: CPTII,S$GLB,, | Performed by: UROLOGY

## 2024-12-03 PROCEDURE — 99214 OFFICE O/P EST MOD 30 MIN: CPT | Mod: S$GLB,,, | Performed by: UROLOGY

## 2024-12-03 RX ORDER — CIPROFLOXACIN 500 MG/1
500 TABLET ORAL ONCE
Qty: 1 TABLET | Refills: 0 | Status: SHIPPED | OUTPATIENT
Start: 2024-12-03 | End: 2024-12-03

## 2024-12-03 NOTE — Clinical Note
Please schedule Uronav prostate biopsy at [facility]  Please get a urine cs asap and review the instructions with the patient  I placed the orders for urine cs, antibiotics and the biopsy  Thanks  Sc

## 2024-12-03 NOTE — TELEPHONE ENCOUNTER
Uronav scheduled. Will wait to schedule follow-up pending path results. Patient prefers virtual visit.

## 2024-12-12 ENCOUNTER — TELEPHONE (OUTPATIENT)
Dept: UROLOGY | Facility: CLINIC | Age: 54
End: 2024-12-12
Payer: COMMERCIAL

## 2024-12-12 NOTE — TELEPHONE ENCOUNTER
Left voicemail. Instructions given. Parking available on the [facility] open Parking lot. Check in on 2nd floor of the [facility]. Please arrive 15 minutes prior to procedure. Instructions given for enema and cipro. Stop all blood thinners.

## 2024-12-13 ENCOUNTER — PATIENT MESSAGE (OUTPATIENT)
Dept: UROLOGY | Facility: CLINIC | Age: 54
End: 2024-12-13
Payer: COMMERCIAL

## 2024-12-13 ENCOUNTER — PROCEDURE VISIT (OUTPATIENT)
Dept: UROLOGY | Facility: CLINIC | Age: 54
End: 2024-12-13
Payer: COMMERCIAL

## 2024-12-13 VITALS — RESPIRATION RATE: 17 BRPM | SYSTOLIC BLOOD PRESSURE: 141 MMHG | HEART RATE: 69 BPM | DIASTOLIC BLOOD PRESSURE: 78 MMHG

## 2024-12-13 DIAGNOSIS — C61 PROSTATE CANCER: ICD-10-CM

## 2024-12-13 RX ORDER — LIDOCAINE HYDROCHLORIDE 20 MG/ML
JELLY TOPICAL
Status: COMPLETED | OUTPATIENT
Start: 2024-12-13 | End: 2024-12-13

## 2024-12-13 RX ORDER — CEFTRIAXONE 1 G/1
1 INJECTION, POWDER, FOR SOLUTION INTRAMUSCULAR; INTRAVENOUS
Status: COMPLETED | OUTPATIENT
Start: 2024-12-13 | End: 2024-12-13

## 2024-12-13 RX ORDER — LIDOCAINE HYDROCHLORIDE 10 MG/ML
20 INJECTION, SOLUTION INFILTRATION; PERINEURAL
Status: COMPLETED | OUTPATIENT
Start: 2024-12-13 | End: 2024-12-13

## 2024-12-13 RX ADMIN — LIDOCAINE HYDROCHLORIDE 20 ML: 10 INJECTION, SOLUTION INFILTRATION; PERINEURAL at 01:12

## 2024-12-13 RX ADMIN — CEFTRIAXONE 1 G: 1 INJECTION, POWDER, FOR SOLUTION INTRAMUSCULAR; INTRAVENOUS at 12:12

## 2024-12-13 RX ADMIN — LIDOCAINE HYDROCHLORIDE 10 ML: 20 JELLY TOPICAL at 12:12

## 2024-12-13 NOTE — PROCEDURES
"Transrectal Ultrasound w/ Biopsy    Date/Time: 12/13/2024 2:09 PM    Performed by: Jason Jung MD  Authorized by: Jason Jung MD    Consent Done?:  Yes (Written)  Time out: Immediately prior to procedure a "time out" was called to verify the correct patient, procedure, equipment, support staff and site/side marked as required.    Indications: Prostate Nodules, Prostate Cancer and Elevated PSA    Preparation: Patient was prepped and draped in usual sterile fashion    Position:  Left lateral  Anesthesia:  Lidocaine jelly and 20cc's 1% Lidocaine  Patient sedated: No    Lesions:: No    Left Base Biopsies: 2  Left Mid Biopsies: 2  Left [place] Biopsies: 2  Right Base Biopsies: 2  Right Mid Biopsies: 2  Right [place] Biopsies: 2  Total Biopsies:  12    Patient tolerance:  Patient tolerated the procedure well with no immediate complications     MR and US imaged segmented and co-registered with uronav  There no no TRUS abnormality visible on the TRUS today even though there was a subtle abnormality noted on TRUS during his last biopsy    4 additional cores taken from target for total of 16 cores    Urine cs neg  cipro and enemas and Rocephin done      Standard instructions given  RTC 1-2 weeks to discuss pathology results with Dr Jung VV OK          "

## 2024-12-13 NOTE — PATIENT INSTRUCTIONS
What to Expect After a Prostate Biopsy    You may have mild bleeding from the rectum or urine for about 1 week to 1 month, or in your ejaculate for several months. This bleeding is normal and expected, and it will stop. You may have mild discomfort in your rectal or urethral area for 24-48 hours.    You cannot do any strenuous lifting, straining, or exercising for 24 hours. You may return to full activity the day after the biopsy.    You may continue to take all your regular medications after the procedure except for the blood thinners.    You may resume all blood-thinning medications once you no longer see any bleeding or whenever your physician prescribing the medication says it is all right to do so. You may take Tylenol if you have a fever and your temperature is less than 100° F or if you have some discomfort.    You will receive a call from the Urology Department at [facility] with the results of your prostate biopsy within one week.    Signs and Symptoms to Report    Call your [facility] urologist at [phone] if you develop any of the following:  Temperature greater than 101°  F  Inability to urinate  A large amount of bleeding from the rectum or in the urine  Persistent or severe pain    After hours or on weekends, you may reach a urology resident on call at this number: [phone].

## 2024-12-17 ENCOUNTER — PATIENT MESSAGE (OUTPATIENT)
Dept: UROLOGY | Facility: CLINIC | Age: 54
End: 2024-12-17
Payer: COMMERCIAL

## 2024-12-18 NOTE — PROGRESS NOTES
Subjective:      Cornell Gordon is a 53 y.o. male who returns today regarding his     No complications with biopsy    The patient location is: home  The chief complaint leading to consultation is: prostate cancer    Visit type: audiovisual    Face to Face time with patient: 10 min  45 minutes of total time spent on the encounter, which includes face to face time and non-face to face time preparing to see the patient (eg, review of tests), Obtaining and/or reviewing separately obtained history, Documenting clinical information in the electronic or other health record, Independently interpreting results (not separately reported) and communicating results to the patient/family/caregiver, or Care coordination (not separately reported).         Each patient to whom he or she provides medical services by telemedicine is:  (1) informed of the relationship between the physician and patient and the respective role of any other health care provider with respect to management of the patient; and (2) notified that he or she may decline to receive medical services by telemedicine and may withdraw from such care at any time.    Notes:     .    The following portions of the patient's history were reviewed and updated as appropriate: allergies, current medications, past family history, past medical history, past social history, past surgical history and problem list.    Review of Systems  Pertinent items are noted in HPI.  A comprehensive multipoint review of systems was negative except as otherwise stated in the HPI.    Past Medical History:   Diagnosis Date    Prostate Cancer      Past Surgical History:   Procedure Laterality Date    COLONOSCOPY N/A 6/7/2024    Procedure: COLONOSCOPY;  Surgeon: Sam Adorno MD;  Location: [facility];  Service: Endoscopy;  Laterality: N/A;    COLONOSCOPY N/A 10/11/2024    Procedure: COLONOSCOPY;  Surgeon: Koko Pérez DO;  Location: [facility] ENDO;  Service: Endoscopy;  Laterality: N/A;        Review of patient's allergies indicates:   Allergen Reactions    Motrin [ibuprofen] Hives          Objective:   Vitals: There were no vitals taken for this visit.    Physical Exam   General: alert and oriented, no acute distress  Respiratory: Symmetric expansion, non-labored breathing  Cardiovascular: no peripheral edema  Abdomen: non distended  Skin: normal coloration and turgor, no rashes, no suspicious skin lesions noted  Neuro: no gross deficits  Psych: normal judgment and insight, normal mood/affect, and non-anxious    Physical Exam    Lab Review   Urinalysis demonstrates no specimen    Lab Results   Component Value Date    WBC 5.09 02/02/2024    HGB 14.0 02/02/2024    HCT 44.0 02/02/2024    MCV 85 02/02/2024     02/02/2024     Lab Results   Component Value Date    CREATININE 1.0 04/05/2024    BUN 14 04/05/2024     Lab Results   Component Value Date    PSA 4.7 (H) 02/02/2024    PSA 3.7 10/14/2022    PSA 2.5 08/13/2020    PSADIAG 6.0 (H) 11/22/2024    PSATOTAL 6.3 (H) 04/05/2024                 Final Pathologic Diagnosis 1 - PROSTATE, LEFT APEX, NEEDLE CORE BIOPSY:    - Prostate adenocarcinoma, [place] (3 + 3 = 6, Grade Group 1), involving 1 of 2 cores and 5% of the total tissue.    2 - PROSTATE, LEFT MIDDLE, NEEDLE CORE BIOPSY:    - Benign prostate glands and stroma.  - Negative for malignancy.    3 - PROSTATE, LEFT BASE, NEEDLE CORE BIOPSY:    - Benign prostate glands and stroma.  - Negative for malignancy.    4 - PROSTATE, RIGHT APEX, NEEDLE CORE BIOPSY:    - Benign prostate glands and stroma.  - Negative for malignancy.    5 - PROSTATE, RIGHT MIDDLE, NEEDLE CORE BIOPSY:    - Benign prostate glands and stroma.  - Negative for malignancy.    6 - PROSTATE, RIGHT BASE, NEEDLE CORE BIOPSY:    - Benign prostate glands and stroma.  - Negative for malignancy.    7 - PROSTATE, TARGET LESION, NEEDLE CORE BIOPSY:    - Prostate adenocarcinoma, [place] (3 + 3 = 6, Grade Group 1), involving 2 of 4 cores and  40% of the total tissue.   Comment: Interp By Ulisses Laura M.D., Signed on 12/17/2024 at 12:28       Imaging  MRI PROSTATE W W/O CONTRAST     CLINICAL HISTORY:  left mid/apex [place] 6 3+3 GG1 cT2a cN0 cMx; psa psa 6.3;  Malignant neoplasm of prostate     TECHNIQUE:  MRI prostate in accordance with PI-RADS recommendations before and after intravenous administration of 10 mL Gadavist.     COMPARISON:  Prostate MRI 05/03/2024     FINDINGS:  PSA: 6.3 ng/mL on 04/05/2024.     Previous biopsy: 06/14/2024, adenocarcinoma in the left peripheral zone target lesion, Mary 6.     Prior therapy: None.     Prostate: 5.6 x 5.1 x 4.2 cm corresponding to a computed volume of 62 cc.  There are regions of T1 hyperintense signal compatible with post biopsy hemorrhage.     Peripheral zone: Linear/wedge-shaped regions of mild T2 hypointense, mild DWI hyperintense, and/or mild ADC hypointense signal in the peripheral zone bilaterally.  Suspicious lesion as follows:     Lesion #P-1     Location: Left, posterior lateral, mid gland-apex     Size: 1.2 cm (06:14), unchanged from 05/03/2024     T2W: Heterogeneous signal intensity or non-circumscribed, rounded, moderate hypointensity. Includes others that do not qualify as 2, 4, or 5. Score 3.     DWI: Focal (discrete and different from the background) hypointense on ADC and/or focal hyperintense on high b-value DWI; may be markedly hypointense on ADC or markedly hyperintense on high b-value DWI, but not both. Score 3.     DCE: Negative.     Prostate margin: Tumor-capsule interface >1.0 cm, but no breach of the capsule.     PI-RADS category: 3     Transitional zone: Benign prostatic hyperplasia. No focal lesion concerning for prostate cancer.     Neurovascular bundle: Normal appearance.     Seminal vesicles: Normal appearance.     Other pelvic organs: Bladder and rectum are unremarkable.     Lymph nodes: No lymphadenopathy.     Soft tissues/bones: No marrow replacing lesions.      Impression:     Unchanged 1.2 cm lesion in the left peripheral zone, previously biopsied as adenocarcinoma.  There is broad-based capsular abutment but no extraprostatic extension.  No regional lymphadenopathy.     No new suspicious lesions.     Number of targets created for potential MR/US fusion biopsy:     Peripheral zone: 0     Transition zone: 0        Electronically signed by:Cornell Antonio  Date:                                            11/22/2024  Time:                                           14:56  Assessment and Plan:   Prostate cancer  [place] 6 3+3 cT2 cN0 cMx; psa 6.3  [place] 6 3+3 GG1 cT2a cN0 cMx; psa psa 6.3        We discussed his grade and stage of disease, life expectancy and active surveillance vs treatment.  We discussed the roles of surgery, radiation (external and brachytherapy), HIFU and Cryosurgery, hormonal therapy and chemotherapy in the treatment of prostate cancer.  We discussed brachytherapy and external radiation therapy and open and robotic surgery.  We discussed the risks and benefits of each. We discussed erectile dysfunction, urinary incontinence and bowel issues.  We discussed referral to radiation oncology to further discuss options.  I answered his questions.     He prefers active surveillance    Confirmatory biopsy consistent with initial biopsy    RTC 6 months with psa to see Dr Jung        Enlarged prostate [facility]        Family hist of prostate ca  Genetics referral for germline testing  There is very small volume of prostate ca on biopsy, so I doubt somatic testing will be possible with this sample                  I spent 45 min on the day of this encounter preparing for, treating and managing the above          Enlarged prostate 62  Cont monitoring      Visit today included increased complexity associated with the care of the episodic problem prostate cancer addressed and managing the longitudinal care of the patient due to the serious and/or complex managed  problem(s) prostate cancer.

## 2024-12-19 ENCOUNTER — OFFICE VISIT (OUTPATIENT)
Dept: UROLOGY | Facility: CLINIC | Age: 54
End: 2024-12-19
Payer: COMMERCIAL

## 2024-12-19 ENCOUNTER — TELEPHONE (OUTPATIENT)
Dept: UROLOGY | Facility: CLINIC | Age: 54
End: 2024-12-19

## 2024-12-19 DIAGNOSIS — C61 PROSTATE CANCER: Primary | ICD-10-CM

## 2024-12-19 NOTE — TELEPHONE ENCOUNTER
----- Message from Jason Jung MD sent at 12/19/2024  2:36 PM CST -----  See Dr Jung 6 months with psa please  
actual/infant

## 2025-05-17 ENCOUNTER — PATIENT MESSAGE (OUTPATIENT)
Dept: UROLOGY | Facility: CLINIC | Age: 55
End: 2025-05-17
Payer: COMMERCIAL

## 2025-05-20 ENCOUNTER — LAB VISIT (OUTPATIENT)
Dept: LAB | Facility: HOSPITAL | Age: 55
End: 2025-05-20
Attending: UROLOGY
Payer: COMMERCIAL

## 2025-05-20 DIAGNOSIS — C61 PROSTATE CANCER: ICD-10-CM

## 2025-05-20 LAB — PSA SERPL-MCNC: 5.15 NG/ML

## 2025-05-20 PROCEDURE — 84153 ASSAY OF PSA TOTAL: CPT

## 2025-05-20 PROCEDURE — 36415 COLL VENOUS BLD VENIPUNCTURE: CPT | Mod: PN

## 2025-05-21 ENCOUNTER — TELEPHONE (OUTPATIENT)
Dept: UROLOGY | Facility: CLINIC | Age: 55
End: 2025-05-21
Payer: COMMERCIAL

## 2025-05-21 ENCOUNTER — RESULTS FOLLOW-UP (OUTPATIENT)
Dept: UROLOGY | Facility: HOSPITAL | Age: 55
End: 2025-05-21
Payer: COMMERCIAL

## 2025-05-21 NOTE — TELEPHONE ENCOUNTER
Staff called pt to clarify appt tomorrow in clinic. Pt scheduled for  when he has established care with . Pt stated that he didn't know if  was available. Pt was reschedule to  and in person visit was converted to telehealth per pt's request. Pt confirmed and voiced understanding.

## 2025-05-21 NOTE — PROGRESS NOTES
Subjective:      Cornell Gordon is a 54 y.o. male who returns today regarding his     The patient location is: home  The chief complaint leading to consultation is: prostate cancer    Visit type: audiovisual    Face to Face time with patient: 7 min  30 minutes of total time spent on the encounter, which includes face to face time and non-face to face time preparing to see the patient (eg, review of tests), Obtaining and/or reviewing separately obtained history, Documenting clinical information in the electronic or other health record, Independently interpreting results (not separately reported) and communicating results to the patient/family/caregiver, or Care coordination (not separately reported).         Each patient to whom he or she provides medical services by telemedicine is:  (1) informed of the relationship between the physician and patient and the respective role of any other health care provider with respect to management of the patient; and (2) notified that he or she may decline to receive medical services by telemedicine and may withdraw from such care at any time.    Notes:   .    The following portions of the patient's history were reviewed and updated as appropriate: allergies, current medications, past family history, past medical history, past social history, past surgical history and problem list.    Review of Systems  Pertinent items are noted in HPI.  A comprehensive multipoint review of systems was negative except as otherwise stated in the HPI.    Past Medical History:   Diagnosis Date    Prostate Cancer      Past Surgical History:   Procedure Laterality Date    COLONOSCOPY N/A 6/7/2024    Procedure: COLONOSCOPY;  Surgeon: Sam Adorno MD;  Location: [facility];  Service: Endoscopy;  Laterality: N/A;    COLONOSCOPY N/A 10/11/2024    Procedure: COLONOSCOPY;  Surgeon: Koko Pérez DO;  Location: [facility] ENDO;  Service: Endoscopy;  Laterality: N/A;       Review of patient's  allergies indicates:   Allergen Reactions    Motrin [ibuprofen] Hives          Objective:   Vitals: There were no vitals taken for this visit.    Physical Exam   General: alert and oriented, no acute distress  Respiratory: Symmetric expansion, non-labored breathing  Cardiovascular: no peripheral edema  Abdomen: non distended  Skin: normal coloration and turgor, no rashes, no suspicious skin lesions noted  Neuro: no gross deficits  Psych: normal judgment and insight, normal mood/affect, and non-anxious    Physical Exam    Lab Review   Urinalysis demonstrates no specimen    Lab Results   Component Value Date    WBC 5.09 02/02/2024    HGB 14.0 02/02/2024    HCT 44.0 02/02/2024    MCV 85 02/02/2024     02/02/2024     Lab Results   Component Value Date    CREATININE 1.0 04/05/2024    BUN 14 04/05/2024     Lab Results   Component Value Date    PSA 5.15 (H) 05/20/2025    PSA 4.7 (H) 02/02/2024    PSA 3.7 10/14/2022    PSADIAG 6.0 (H) 11/22/2024    PSATOTAL 6.3 (H) 04/05/2024                 Final Pathologic Diagnosis 1 - PROSTATE, LEFT APEX, NEEDLE CORE BIOPSY:    - Prostate adenocarcinoma, [place] (3 + 3 = 6, Grade Group 1), involving 1 of 2 cores and 5% of the total tissue.    2 - PROSTATE, LEFT MIDDLE, NEEDLE CORE BIOPSY:    - Benign prostate glands and stroma.  - Negative for malignancy.    3 - PROSTATE, LEFT BASE, NEEDLE CORE BIOPSY:    - Benign prostate glands and stroma.  - Negative for malignancy.    4 - PROSTATE, RIGHT APEX, NEEDLE CORE BIOPSY:    - Benign prostate glands and stroma.  - Negative for malignancy.    5 - PROSTATE, RIGHT MIDDLE, NEEDLE CORE BIOPSY:    - Benign prostate glands and stroma.  - Negative for malignancy.    6 - PROSTATE, RIGHT BASE, NEEDLE CORE BIOPSY:    - Benign prostate glands and stroma.  - Negative for malignancy.    7 - PROSTATE, TARGET LESION, NEEDLE CORE BIOPSY:    - Prostate adenocarcinoma, [place] (3 + 3 = 6, Grade Group 1), involving 2 of 4 cores and 40% of the total  tissue.   Comment: Interp By Ulisses Laura M.D., Signed on 12/17/2024        Imaging  MRI PROSTATE W W/O CONTRAST     CLINICAL HISTORY:  Prostate cancer suspected;psa 4.7;  Elevated prostate specific antigen (PSA)     Additional history: None provided.     TECHNIQUE:  Multiparametric multiplanar, multisequence images including high resolution, small field-of-view T2-WI; axial diffusion weighted images with multiple B-values and creation of ADC-maps; and dynamic contrast enhanced T1-weighted images through the prostate were obtained before, during, and after the administration of 10 cc intravenous gadolinium.     COMPARISON:  None.     FINDINGS:  Previous biopsy: None.     PSA: 6.3 ng/mL 04/05/2024     Prior therapy: None     Prostate: 5.4 x 4.4 x 4.9 cm corresponding to a computed volume of 57.8 cc.     Peripheral zone: Few linear areas of T2 hypointense signal bilaterally, score 2     Lesion ([facility]) #P-1     Location: Side: Left; Region: Mid to apex; Zone: posterior peripheral zone laterally     Greatest dimension: 1.2 cm     T2-WI: Heterogeneous signal intensity or non-circumscribed, rounded, moderate hypointensity, score 3.     DWI/ADC: Focal (discrete and different from the background) hypointense on ADC and/or focal hyperintense on high b-value DWI; may be markedly hypointense on ADC or markedly hyperintense on high b-value DWI, but not both, score 3.     DCE: Negative     Extraprostatic extension: Negative     PI-RADS assessment category: 3     Transitional zone: Benign prostatic hyperplasia without focal suspicious abnormality, score 2.     Neurovascular bundle: Normal appearance.     Seminal vesicles: Normal appearance.     Adjacent Organ Involvement: No evidence for urinary bladder or rectal invasion.     Lymphadenopathy: None.     Other Findings: None.     Impression:     1. Left peripheral zone lesion with intermediate risk of clinically significant prostate cancer.  2. Benign prostatic  hyperplasia.  Overall Assessment: PI-RADS 3 - Intermediate (the presence of clinically significant cancer is equivocal)     Number of targets created for potential MR/US fusion biopsy     Peripheral zone: 1     Transition zone: 0        Electronically signed by:Gavin Christianson MD  Date:                                            05/04/2024  Time:                                           10:33    Assessment and Plan:   Prostate cancer  Mary 6 3+3 GG1 cT2a cN0 cMx; psa psa 6.3        We discussed his grade and stage of disease, life expectancy and active surveillance vs treatment.  We discussed the roles of surgery, radiation (external and brachytherapy), HIFU and Cryosurgery, hormonal therapy and chemotherapy in the treatment of prostate cancer.  We discussed brachytherapy and external radiation therapy and open and robotic surgery.  We discussed the risks and benefits of each. We discussed erectile dysfunction, urinary incontinence and bowel issues.  We discussed referral to radiation oncology to further discuss options.  I answered his questions.     He prefers active surveillance     Confirmatory biopsy consistent with initial biopsy     RTC 6 months with psa, BMP, and MRI to see Dr Jung        Enlarged prostate 62cc   cont monitoring     Family hist of prostate ca  Genetics referral for germline testing  There is very small volume of prostate ca on biopsy, so I doubt somatic testing will be possible with this sample                                  Visit today included increased complexity associated with the care of the episodic problem prostate cancer addressed and managing the longitudinal care of the patient due to the serious and/or complex managed problem(s) prostate cancer.

## 2025-05-22 ENCOUNTER — OFFICE VISIT (OUTPATIENT)
Dept: UROLOGY | Facility: CLINIC | Age: 55
End: 2025-05-22
Payer: COMMERCIAL

## 2025-05-22 DIAGNOSIS — C61 PROSTATE CANCER: Primary | ICD-10-CM

## 2025-05-23 ENCOUNTER — TELEPHONE (OUTPATIENT)
Dept: UROLOGY | Facility: CLINIC | Age: 55
End: 2025-05-23
Payer: COMMERCIAL

## 2025-05-23 NOTE — TELEPHONE ENCOUNTER
----- Message from Jason Jung MD sent at 5/22/2025  1:08 PM CDT -----  Psa, bmp, MRI prostate in 6 months then see Dr Jung  thanks

## 2025-06-02 NOTE — TELEPHONE ENCOUNTER
----- Message from Jason Jung MD sent at 7/1/2024  2:55 PM CDT -----  See Dr Jung 6 months with psa, BMP, MRI prostate please    See genetics for personal and family hist of prostate ca please   [phone]

## 2025-08-12 ENCOUNTER — OFFICE VISIT (OUTPATIENT)
Dept: FAMILY MEDICINE | Facility: CLINIC | Age: 55
End: 2025-08-12
Payer: COMMERCIAL

## 2025-08-12 ENCOUNTER — LAB VISIT (OUTPATIENT)
Dept: LAB | Facility: HOSPITAL | Age: 55
End: 2025-08-12
Attending: FAMILY MEDICINE
Payer: COMMERCIAL

## 2025-08-12 VITALS
WEIGHT: 215.94 LBS | HEIGHT: 77 IN | RESPIRATION RATE: 16 BRPM | BODY MASS INDEX: 25.5 KG/M2 | HEART RATE: 65 BPM | OXYGEN SATURATION: 99 % | DIASTOLIC BLOOD PRESSURE: 68 MMHG | SYSTOLIC BLOOD PRESSURE: 115 MMHG

## 2025-08-12 DIAGNOSIS — Z23 IMMUNIZATION DUE: ICD-10-CM

## 2025-08-12 DIAGNOSIS — Z00.00 WELLNESS EXAMINATION: ICD-10-CM

## 2025-08-12 DIAGNOSIS — C61 PROSTATE CANCER: ICD-10-CM

## 2025-08-12 DIAGNOSIS — Z00.00 WELLNESS EXAMINATION: Primary | ICD-10-CM

## 2025-08-12 LAB
ABSOLUTE EOSINOPHIL (OHS): 0.07 K/UL
ABSOLUTE MONOCYTE (OHS): 0.4 K/UL (ref 0.3–1)
ABSOLUTE NEUTROPHIL COUNT (OHS): 2.53 K/UL (ref 1.8–7.7)
ALBUMIN SERPL BCP-MCNC: 4.6 G/DL (ref 3.5–5.2)
ALP SERPL-CCNC: 97 UNIT/L (ref 40–150)
ALT SERPL W/O P-5'-P-CCNC: 15 UNIT/L (ref 0–55)
ANION GAP (OHS): 10 MMOL/L (ref 8–16)
AST SERPL-CCNC: 25 UNIT/L (ref 0–50)
BACTERIA #/AREA URNS AUTO: NORMAL /HPF
BASOPHILS # BLD AUTO: 0.04 K/UL
BASOPHILS NFR BLD AUTO: 0.8 %
BILIRUB SERPL-MCNC: 0.6 MG/DL (ref 0.1–1)
BILIRUB UR QL STRIP.AUTO: NEGATIVE
BUN SERPL-MCNC: 20 MG/DL (ref 6–20)
CALCIUM SERPL-MCNC: 9.1 MG/DL (ref 8.7–10.5)
CHLORIDE SERPL-SCNC: 104 MMOL/L (ref 95–110)
CHOLEST SERPL-MCNC: 186 MG/DL (ref 120–199)
CHOLEST/HDLC SERPL: 3.4 {RATIO} (ref 2–5)
CLARITY UR: CLEAR
CO2 SERPL-SCNC: 26 MMOL/L (ref 23–29)
COLOR UR AUTO: COLORLESS
CREAT SERPL-MCNC: 1 MG/DL (ref 0.5–1.4)
EAG (OHS): 111 MG/DL (ref 68–131)
ERYTHROCYTE [DISTWIDTH] IN BLOOD BY AUTOMATED COUNT: 13.1 % (ref 11.5–14.5)
GFR SERPLBLD CREATININE-BSD FMLA CKD-EPI: >60 ML/MIN/1.73/M2
GLUCOSE SERPL-MCNC: 85 MG/DL (ref 70–110)
GLUCOSE UR QL STRIP: NEGATIVE
HBA1C MFR BLD: 5.5 % (ref 4–5.6)
HCT VFR BLD AUTO: 43.3 % (ref 40–54)
HDLC SERPL-MCNC: 54 MG/DL (ref 40–75)
HDLC SERPL: 29 % (ref 20–50)
HGB BLD-MCNC: 14.1 GM/DL (ref 14–18)
HGB UR QL STRIP: NEGATIVE
IMM GRANULOCYTES # BLD AUTO: 0.01 K/UL (ref 0–0.04)
IMM GRANULOCYTES NFR BLD AUTO: 0.2 % (ref 0–0.5)
KETONES UR QL STRIP: NEGATIVE
LDLC SERPL CALC-MCNC: 117.2 MG/DL (ref 63–159)
LEUKOCYTE ESTERASE UR QL STRIP: NEGATIVE
LYMPHOCYTES # BLD AUTO: 1.77 K/UL (ref 1–4.8)
MCH RBC QN AUTO: 27.3 PG (ref 27–31)
MCHC RBC AUTO-ENTMCNC: 32.6 G/DL (ref 32–36)
MCV RBC AUTO: 84 FL (ref 82–98)
MICROSCOPIC COMMENT: NORMAL
NITRITE UR QL STRIP: NEGATIVE
NONHDLC SERPL-MCNC: 132 MG/DL
NUCLEATED RBC (/100WBC) (OHS): 0 /100 WBC
PH UR STRIP: 7 [PH]
PLATELET # BLD AUTO: 218 K/UL (ref 150–450)
PMV BLD AUTO: 12.9 FL (ref 9.2–12.9)
POTASSIUM SERPL-SCNC: 4.4 MMOL/L (ref 3.5–5.1)
PROT SERPL-MCNC: 7.8 GM/DL (ref 6–8.4)
PROT UR QL STRIP: NEGATIVE
PSA FREE MFR SERPL: 15.82 %
PSA FREE SERPL-MCNC: 0.87 NG/ML
PSA SERPL-MCNC: 5.5 NG/ML
RBC # BLD AUTO: 5.17 M/UL (ref 4.6–6.2)
RELATIVE EOSINOPHIL (OHS): 1.5 %
RELATIVE LYMPHOCYTE (OHS): 36.7 % (ref 18–48)
RELATIVE MONOCYTE (OHS): 8.3 % (ref 4–15)
RELATIVE NEUTROPHIL (OHS): 52.5 % (ref 38–73)
SODIUM SERPL-SCNC: 140 MMOL/L (ref 136–145)
SP GR UR STRIP: <1.005
T4 FREE SERPL-MCNC: 1.06 NG/DL (ref 0.71–1.51)
TRIGL SERPL-MCNC: 74 MG/DL (ref 30–150)
TSH SERPL-ACNC: 1.85 UIU/ML (ref 0.4–4)
UROBILINOGEN UR STRIP-ACNC: NEGATIVE EU/DL
WBC # BLD AUTO: 4.82 K/UL (ref 3.9–12.7)

## 2025-08-12 PROCEDURE — 85025 COMPLETE CBC W/AUTO DIFF WBC: CPT

## 2025-08-12 PROCEDURE — 80053 COMPREHEN METABOLIC PANEL: CPT

## 2025-08-12 PROCEDURE — 84439 ASSAY OF FREE THYROXINE: CPT

## 2025-08-12 PROCEDURE — 84443 ASSAY THYROID STIM HORMONE: CPT

## 2025-08-12 PROCEDURE — 84153 ASSAY OF PSA TOTAL: CPT

## 2025-08-12 PROCEDURE — 36415 COLL VENOUS BLD VENIPUNCTURE: CPT | Mod: PN

## 2025-08-12 PROCEDURE — 83036 HEMOGLOBIN GLYCOSYLATED A1C: CPT

## 2025-08-12 PROCEDURE — 99999 PR PBB SHADOW E&M-EST. PATIENT-LVL IV: CPT | Mod: PBBFAC,,, | Performed by: FAMILY MEDICINE

## 2025-08-12 PROCEDURE — 80061 LIPID PANEL: CPT

## 2025-08-12 PROCEDURE — 99396 PREV VISIT EST AGE 40-64: CPT | Mod: S$GLB,,, | Performed by: FAMILY MEDICINE

## 2025-08-12 PROCEDURE — 81001 URINALYSIS AUTO W/SCOPE: CPT

## 2025-08-13 LAB — HOLD SPECIMEN: NORMAL
